# Patient Record
Sex: FEMALE | Race: WHITE | NOT HISPANIC OR LATINO | URBAN - METROPOLITAN AREA
[De-identification: names, ages, dates, MRNs, and addresses within clinical notes are randomized per-mention and may not be internally consistent; named-entity substitution may affect disease eponyms.]

---

## 2023-01-05 ENCOUNTER — OFFICE VISIT (OUTPATIENT)
Dept: FAMILY MEDICINE | Facility: CLINIC | Age: 43
End: 2023-01-05
Payer: COMMERCIAL

## 2023-01-05 VITALS
DIASTOLIC BLOOD PRESSURE: 65 MMHG | HEIGHT: 65 IN | TEMPERATURE: 98.7 F | BODY MASS INDEX: 25.01 KG/M2 | HEART RATE: 60 BPM | SYSTOLIC BLOOD PRESSURE: 114 MMHG | RESPIRATION RATE: 16 BRPM | WEIGHT: 150.08 LBS | OXYGEN SATURATION: 98 %

## 2023-01-05 DIAGNOSIS — F41.8 DEPRESSION WITH ANXIETY: Primary | ICD-10-CM

## 2023-01-05 DIAGNOSIS — N32.81 OVERACTIVE BLADDER: ICD-10-CM

## 2023-01-05 DIAGNOSIS — Z23 NEED FOR VACCINATION: ICD-10-CM

## 2023-01-05 PROBLEM — Z97.5 IUD (INTRAUTERINE DEVICE) IN PLACE: Status: ACTIVE | Noted: 2019-01-23

## 2023-01-05 PROBLEM — F43.22 ADJUSTMENT DISORDER WITH ANXIETY: Status: ACTIVE | Noted: 2022-02-28

## 2023-01-05 RX ORDER — ESCITALOPRAM OXALATE 10 MG/1
TABLET ORAL
Qty: 21 TABLET | Refills: 0 | Status: SHIPPED | OUTPATIENT
Start: 2023-01-05 | End: 2023-02-07

## 2023-01-05 RX ORDER — SERTRALINE HYDROCHLORIDE 100 MG/1
2 TABLET, FILM COATED ORAL DAILY
COMMUNITY
Start: 2022-10-19 | End: 2023-02-07

## 2023-01-05 RX ORDER — OXYBUTYNIN CHLORIDE 5 MG/1
5 TABLET ORAL
COMMUNITY
Start: 2021-12-28 | End: 2023-01-05

## 2023-01-05 RX ORDER — TRETINOIN 0.25 MG/G
CREAM TOPICAL
COMMUNITY
Start: 2022-02-09 | End: 2024-03-28

## 2023-01-05 RX ORDER — BENZOYL PEROXIDE 5 G/100G
GEL TOPICAL
COMMUNITY
Start: 2021-12-28 | End: 2024-03-28

## 2023-01-05 ASSESSMENT — ANXIETY QUESTIONNAIRES
7. FEELING AFRAID AS IF SOMETHING AWFUL MIGHT HAPPEN: NOT AT ALL
IF YOU CHECKED OFF ANY PROBLEMS ON THIS QUESTIONNAIRE, HOW DIFFICULT HAVE THESE PROBLEMS MADE IT FOR YOU TO DO YOUR WORK, TAKE CARE OF THINGS AT HOME, OR GET ALONG WITH OTHER PEOPLE: SOMEWHAT DIFFICULT
3. WORRYING TOO MUCH ABOUT DIFFERENT THINGS: NOT AT ALL
GAD7 TOTAL SCORE: 7
1. FEELING NERVOUS, ANXIOUS, OR ON EDGE: SEVERAL DAYS
6. BECOMING EASILY ANNOYED OR IRRITABLE: NEARLY EVERY DAY
2. NOT BEING ABLE TO STOP OR CONTROL WORRYING: NOT AT ALL
GAD7 TOTAL SCORE: 7
5. BEING SO RESTLESS THAT IT IS HARD TO SIT STILL: NOT AT ALL

## 2023-01-05 ASSESSMENT — PATIENT HEALTH QUESTIONNAIRE - PHQ9
SUM OF ALL RESPONSES TO PHQ QUESTIONS 1-9: 6
5. POOR APPETITE OR OVEREATING: NEARLY EVERY DAY

## 2023-01-05 NOTE — PATIENT INSTRUCTIONS
DECREASE sertraline to 50 mg per day (1/2 tablet)  START escitalopram (Lexapro) 5 mg per day  Continue this for 7-14 days    Then  STOP sertraline  INCREASE Lexapro to 10 mg per day    Urology referral today for overactive bladder    Follow-up with me in 4 weeks for medication check.

## 2023-01-05 NOTE — NURSING NOTE
Prior to immunization administration, verified patients identity using patient s name and date of birth. Please see Immunization Activity for additional information.     Screening Questionnaire for Adult Immunization    Are you sick today?   No   Do you have allergies to medications, food, a vaccine component or latex?   No   Have you ever had a serious reaction after receiving a vaccination?   No   Do you have a long-term health problem with heart, lung, kidney, or metabolic disease (e.g., diabetes), asthma, a blood disorder, no spleen, complement component deficiency, a cochlear implant, or a spinal fluid leak?  Are you on long-term aspirin therapy?   No   Do you have cancer, leukemia, HIV/AIDS, or any other immune system problem?   No   Do you have a parent, brother, or sister with an immune system problem?   No   In the past 3 months, have you taken medications that affect  your immune system, such as prednisone, other steroids, or anticancer drugs; drugs for the treatment of rheumatoid arthritis, Crohn s disease, or psoriasis; or have you had radiation treatments?   No   Have you had a seizure, or a brain or other nervous system problem?   No   During the past year, have you received a transfusion of blood or blood    products, or been given immune (gamma) globulin or antiviral drug?   No   For women: Are you pregnant or is there a chance you could become       pregnant during the next month?   No   Have you received any vaccinations in the past 4 weeks?   No     Immunization questionnaire answers were all negative.        Per orders of Dr. Eli, injection of Tdap given by Lizy Clemens MA. Patient instructed to remain in clinic for 15 minutes afterwards, and to report any adverse reaction to me immediately.       Screening performed by Lizy Clemens MA on 1/5/2023 at 1:44 PM.

## 2023-01-05 NOTE — PROGRESS NOTES
CC: Recheck Medication (Would like to review medications.)        ASSESSMENT/PLAN: 43 y/o F with a history of depression, anxiety, overactive bladder who presents for follow-up of several concerns.    Lupe was seen today for recheck medication.    Diagnoses and all orders for this visit:    Depression with anxiety  -     escitalopram (LEXAPRO) 10 MG tablet; Take 0.5 tablets (5 mg) by mouth daily for 14 days, THEN 1 tablet (10 mg) daily for 14 days.    Overactive bladder  -     Adult Urology  Referral; Future    Need for vaccination  -     TDAP VACCINE (Adacel, Boostrix)  [[id number]]      Would like to change selective serotonin reuptake inhibitor due to concerns for persistent anhedonia, low energy, and general flattening of affect. Has previously tried Wellbutrin, Prozac, lamictal, Paxil. After a discussion of risks and benefits, patient agreeable to a trial of escitalopram for depression/anxiety. Common side effects were discussed and the patient was notified it takes 4-6 weeks to see full impact of this medication. Self cares and the importance of therapy/counseling were also discussed. Patient will follow-up in 4 weeks for a medication check, sooner with any questions, concerns or problems with the medication.     Patient with history of OAB, unable to tolerate oxybutynin due to side effects. Caffeine reduction helps significantly but does not resolve symptoms. Recommend consultation with urology to review treatment options.    Per patient, concern for auditory processing disorder/sensory sensitivity. Will look into treatment options in  Health system.    Worrisome signs and symptoms were discussed with Lupe and she was instructed to return to the clinic for concerning symptoms or to call with questions. All patient questions answered.    Follow up: 4 weeks      Babita Eli MD/MPH  Family Medicine      SUBJECTIVE: Lupe is a 42 year old female who comes in with the following  "concerns:    Mood f/u  Seeing a therapist  Has decreased dose of sertraline from 200 mg to 100 mg a couple  No real change in mood with this dose change  Not sure if mood lability has returned or not  Had previously tried lamictal - improved depression but had a lot of confusion/brain fog as side  Wellbutrin - doesn't remember what her experience was  Paxil, Prozac previously tried - doesn't recall experience    History of difficultly with hearing with background noise  Easily overstimulated by noise with kids  Hearing testing in the past was always normal but she did not discern voices well when background noise was played.    Overactive bladder continues to be an issue  Caffeine reduction helps  However, still causes a lot of anxiety when out and about without easy bathroom access  Will void large amounts of urine with urgency  Oxybutynin did not help much, led to dry mouth and sore throat.    PHQ-9 score:    PHQ 1/5/2023   PHQ-9 Total Score 6   Q9: Thoughts of better off dead/self-harm past 2 weeks Not at all         CHANDNI-7 SCORE 1/5/2023   Total Score 7               OBJECTIVE:   /65 (BP Location: Right arm, Patient Position: Sitting, Cuff Size: Adult Regular)   Pulse 60   Temp 98.7  F (37.1  C) (Skin)   Resp 16   Ht 1.638 m (5' 4.5\")   Wt 68.1 kg (150 lb 1.3 oz)   LMP 12/29/2022 (Approximate)   SpO2 98%   BMI 25.36 kg/m    General: Well-appearing in NAD   HEENT: Normocephalic, atraumatic. Mucus membranes moist.   Resp: No respiratory distress   MSK: No peripheral edema.   Skin: No rashes or lesions noted.   Psych: Appropriate affect.      "

## 2023-01-05 NOTE — NURSING NOTE
"42 year old  Chief Complaint   Patient presents with     Recheck Medication     Would like to review medications.       Blood pressure 114/65, pulse 60, temperature 98.7  F (37.1  C), temperature source Skin, resp. rate 16, height 1.638 m (5' 4.5\"), weight 68.1 kg (150 lb 1.3 oz), last menstrual period 12/29/2022, SpO2 98 %. Body mass index is 25.36 kg/m .  Patient Active Problem List   Diagnosis     Subcutaneous nodules     Sacroiliitis (H)     IUD (intrauterine device) in place     History of depression     Esophageal reflux     Disorder of bladder     Postpartum depression     Back strain     Anxiety state     Allergy     Adjustment disorder with anxiety     Acne     Poor fetal growth, affecting management of mother, antepartum condition or complication       Wt Readings from Last 2 Encounters:   01/05/23 68.1 kg (150 lb 1.3 oz)     BP Readings from Last 3 Encounters:   01/05/23 114/65         Current Outpatient Medications   Medication     benzoyl peroxide 5 % topical gel     levonorgestrel (MIRENA) 20 MCG/DAY IUD     sertraline (ZOLOFT) 100 MG tablet     tretinoin (RETIN-A) 0.025 % external cream     oxybutynin (DITROPAN) 5 MG tablet     No current facility-administered medications for this visit.       Social History     Tobacco Use     Smoking status: Never     Smokeless tobacco: Never   Vaping Use     Vaping Use: Never used   Substance Use Topics     Alcohol use: Yes     Comment: 1-2 times weekly     Drug use: Never       Health Maintenance Due   Topic Date Due     ADVANCE CARE PLANNING  Never done     HIV SCREENING  Never done     HEPATITIS C SCREENING  Never done     PAP  Never done     YEARLY PREVENTIVE VISIT  07/29/2009     PHQ-2 (once per calendar year)  Never done       No results found for: PAP      January 5, 2023 12:56 PM    "

## 2023-02-07 ENCOUNTER — OFFICE VISIT (OUTPATIENT)
Dept: FAMILY MEDICINE | Facility: CLINIC | Age: 43
End: 2023-02-07
Payer: COMMERCIAL

## 2023-02-07 VITALS
SYSTOLIC BLOOD PRESSURE: 109 MMHG | HEART RATE: 69 BPM | DIASTOLIC BLOOD PRESSURE: 72 MMHG | TEMPERATURE: 97.3 F | BODY MASS INDEX: 25.35 KG/M2 | OXYGEN SATURATION: 99 % | WEIGHT: 150 LBS | RESPIRATION RATE: 16 BRPM

## 2023-02-07 DIAGNOSIS — F41.8 DEPRESSION WITH ANXIETY: Primary | ICD-10-CM

## 2023-02-07 DIAGNOSIS — U07.1 INFECTION DUE TO 2019 NOVEL CORONAVIRUS: ICD-10-CM

## 2023-02-07 DIAGNOSIS — H93.25 AUDITORY PROCESSING DISORDER: ICD-10-CM

## 2023-02-07 RX ORDER — ESCITALOPRAM OXALATE 10 MG/1
10 TABLET ORAL DAILY
Qty: 90 TABLET | Refills: 0 | Status: SHIPPED | OUTPATIENT
Start: 2023-02-07 | End: 2023-03-07

## 2023-02-07 ASSESSMENT — ANXIETY QUESTIONNAIRES
7. FEELING AFRAID AS IF SOMETHING AWFUL MIGHT HAPPEN: NOT AT ALL
GAD7 TOTAL SCORE: 7
5. BEING SO RESTLESS THAT IT IS HARD TO SIT STILL: NOT AT ALL
1. FEELING NERVOUS, ANXIOUS, OR ON EDGE: MORE THAN HALF THE DAYS
IF YOU CHECKED OFF ANY PROBLEMS ON THIS QUESTIONNAIRE, HOW DIFFICULT HAVE THESE PROBLEMS MADE IT FOR YOU TO DO YOUR WORK, TAKE CARE OF THINGS AT HOME, OR GET ALONG WITH OTHER PEOPLE: SOMEWHAT DIFFICULT
GAD7 TOTAL SCORE: 7
3. WORRYING TOO MUCH ABOUT DIFFERENT THINGS: SEVERAL DAYS
6. BECOMING EASILY ANNOYED OR IRRITABLE: NEARLY EVERY DAY
2. NOT BEING ABLE TO STOP OR CONTROL WORRYING: NOT AT ALL

## 2023-02-07 ASSESSMENT — PATIENT HEALTH QUESTIONNAIRE - PHQ9
5. POOR APPETITE OR OVEREATING: SEVERAL DAYS
SUM OF ALL RESPONSES TO PHQ QUESTIONS 1-9: 11

## 2023-02-07 NOTE — PATIENT INSTRUCTIONS
Continue Lexapro 10 mg per day  If persistent symptoms in 2-3 weeks, send me a message and we will increase the dose to 15 mg    Follow-up with me in 4-6 weeks    Schedule occupational therapy appointment for auditory processing therapy/concerns.

## 2023-02-07 NOTE — NURSING NOTE
42 year old  Chief Complaint   Patient presents with     Recheck Medication     Lexapro       Blood pressure 109/72, pulse 69, temperature 97.3  F (36.3  C), temperature source Temporal, resp. rate 16, weight 68 kg (150 lb), last menstrual period 12/29/2022, SpO2 99 %. Body mass index is 25.35 kg/m .  Patient Active Problem List   Diagnosis     Subcutaneous nodules     Sacroiliitis (H)     IUD (intrauterine device) in place     History of depression     Esophageal reflux     Disorder of bladder     Postpartum depression     Back strain     Anxiety state     Allergy     Adjustment disorder with anxiety     Acne     Poor fetal growth, affecting management of mother, antepartum condition or complication       Wt Readings from Last 2 Encounters:   02/07/23 68 kg (150 lb)   01/05/23 68.1 kg (150 lb 1.3 oz)     BP Readings from Last 3 Encounters:   02/07/23 109/72   01/05/23 114/65         Current Outpatient Medications   Medication     benzoyl peroxide 5 % topical gel     levonorgestrel (MIRENA) 20 MCG/DAY IUD     sertraline (ZOLOFT) 100 MG tablet     tretinoin (RETIN-A) 0.025 % external cream     escitalopram (LEXAPRO) 10 MG tablet     No current facility-administered medications for this visit.       Social History     Tobacco Use     Smoking status: Never     Smokeless tobacco: Never   Vaping Use     Vaping Use: Never used   Substance Use Topics     Alcohol use: Yes     Comment: 1-2 times weekly     Drug use: Never       Health Maintenance Due   Topic Date Due     ADVANCE CARE PLANNING  Never done     HIV SCREENING  Never done     HEPATITIS C SCREENING  Never done     YEARLY PREVENTIVE VISIT  07/29/2009       No results found for: PAP      February 7, 2023 12:55 PM

## 2023-02-07 NOTE — PROGRESS NOTES
CC: Recheck Medication (Lexapro)        ASSESSMENT/PLAN: 43 y/o F with depression, anxiety, auditory processing disorder (informal diagnosis) presenting for medication follow-up. High stress month at home with  traveling + family getting COVID so hard to tell if Lexapro is more effective than sertraline. Will continue for another 2-3 weeks then consider a dose increase, tolerating well. Follow-up in 4-6 weeks.    Lupe was seen today for recheck medication.    Diagnoses and all orders for this visit:    Depression with anxiety  -     escitalopram (LEXAPRO) 10 MG tablet; Take 1 tablet (10 mg) by mouth daily      Infection due to 2019 novel coronavirus  Symptoms improving/resolving    Auditory processing disorder  -     Occupational Therapy Referral; Future  Patient with concerns of auditory overwhelm with multiple symptoms or loud noises. Would like to have therapy to address these ongoing concerns, presumptive diagnosis made when she was younger. Consider formal neuropsych testing in the future      Worrisome signs and symptoms were discussed with Lupe and she was instructed to return to the clinic for concerning symptoms or to call with questions. All patient questions answered.    Follow up: 4 weeks    Babita Eli MD/MPH  Family Medicine      SUBJECTIVE: Lupe is a 42 year old female who comes in with the following concerns:    Med check  Not sure how well the med change is working.    No side effects, issues with making the change. Tolerating it better than previous antidepressant  Taking 10 mg escitalopram x 2 weeks  This month has been stressful -  has been traveling, she and her children got COVID    COVID - moderate cold-like symptoms  Able to tolerate a walk over lunch  Respiratory status improving, still fatigued but improving daily    Would be interested in occupational therapy to address auditory processing symptoms as she feels this contributes to stress/anxiety    PHQ 1/5/2023  2/7/2023   PHQ-9 Total Score 6 11   Q9: Thoughts of better off dead/self-harm past 2 weeks Not at all Not at all     CHANDNI-7 SCORE 1/5/2023 2/7/2023   Total Score 7 7             OBJECTIVE:   /72 (BP Location: Left arm, Patient Position: Sitting, Cuff Size: Adult Regular)   Pulse 69   Temp 97.3  F (36.3  C) (Temporal)   Resp 16   Wt 68 kg (150 lb)   LMP 12/29/2022 (Approximate)   SpO2 99%   BMI 25.35 kg/m    General: Well-appearing in NAD   HEENT: Normocephalic, atraumatic. Mucus membranes moist.   Resp: No respiratory distress   MSK: No peripheral edema.   Skin: No rashes or lesions noted.   Psych: Appropriate affect. Mood is good

## 2023-02-17 ENCOUNTER — OFFICE VISIT (OUTPATIENT)
Dept: UROLOGY | Facility: CLINIC | Age: 43
End: 2023-02-17
Payer: COMMERCIAL

## 2023-02-17 VITALS
HEIGHT: 64 IN | HEART RATE: 80 BPM | WEIGHT: 140 LBS | SYSTOLIC BLOOD PRESSURE: 110 MMHG | OXYGEN SATURATION: 99 % | BODY MASS INDEX: 23.9 KG/M2 | DIASTOLIC BLOOD PRESSURE: 62 MMHG

## 2023-02-17 DIAGNOSIS — R39.15 URINARY URGENCY: Primary | ICD-10-CM

## 2023-02-17 DIAGNOSIS — R35.0 URINARY FREQUENCY: ICD-10-CM

## 2023-02-17 DIAGNOSIS — N39.3 FEMALE STRESS INCONTINENCE: ICD-10-CM

## 2023-02-17 LAB
ALBUMIN UR-MCNC: NEGATIVE MG/DL
APPEARANCE UR: CLEAR
BILIRUB UR QL STRIP: NEGATIVE
COLOR UR AUTO: YELLOW
GLUCOSE UR STRIP-MCNC: NEGATIVE MG/DL
HGB UR QL STRIP: ABNORMAL
KETONES UR STRIP-MCNC: ABNORMAL MG/DL
LEUKOCYTE ESTERASE UR QL STRIP: NEGATIVE
MUCOUS THREADS #/AREA URNS LPF: PRESENT /LPF
NITRATE UR QL: NEGATIVE
PH UR STRIP: 6 [PH] (ref 5–7)
RBC URINE: 0 /HPF
RESIDUAL VOLUME (RV) (EXTERNAL): 43
SP GR UR STRIP: 1.02 (ref 1–1.03)
SQUAMOUS EPITHELIAL: 1 /HPF
UROBILINOGEN UR STRIP-ACNC: 0.2 E.U./DL
WBC URINE: 0 /HPF

## 2023-02-17 PROCEDURE — 99204 OFFICE O/P NEW MOD 45 MIN: CPT | Mod: 25 | Performed by: PHYSICIAN ASSISTANT

## 2023-02-17 PROCEDURE — 81001 URINALYSIS AUTO W/SCOPE: CPT | Performed by: PHYSICIAN ASSISTANT

## 2023-02-17 PROCEDURE — 51798 US URINE CAPACITY MEASURE: CPT | Performed by: PHYSICIAN ASSISTANT

## 2023-02-17 RX ORDER — MIRABEGRON 25 MG/1
25 TABLET, FILM COATED, EXTENDED RELEASE ORAL DAILY
Qty: 30 TABLET | Refills: 3 | Status: SHIPPED | OUTPATIENT
Start: 2023-02-17 | End: 2023-02-20

## 2023-02-17 ASSESSMENT — ANXIETY QUESTIONNAIRES
6. BECOMING EASILY ANNOYED OR IRRITABLE: NEARLY EVERY DAY
5. BEING SO RESTLESS THAT IT IS HARD TO SIT STILL: NOT AT ALL
1. FEELING NERVOUS, ANXIOUS, OR ON EDGE: SEVERAL DAYS
7. FEELING AFRAID AS IF SOMETHING AWFUL MIGHT HAPPEN: NOT AT ALL
2. NOT BEING ABLE TO STOP OR CONTROL WORRYING: NOT AT ALL
GAD7 TOTAL SCORE: 8
GAD7 TOTAL SCORE: 8
3. WORRYING TOO MUCH ABOUT DIFFERENT THINGS: SEVERAL DAYS

## 2023-02-17 ASSESSMENT — PATIENT HEALTH QUESTIONNAIRE - PHQ9
5. POOR APPETITE OR OVEREATING: NEARLY EVERY DAY
SUM OF ALL RESPONSES TO PHQ QUESTIONS 1-9: 8

## 2023-02-17 ASSESSMENT — PAIN SCALES - GENERAL: PAINLEVEL: NO PAIN (0)

## 2023-02-17 NOTE — NURSING NOTE
Chief Complaint   Patient presents with     Overactive Bladder      Patient here today to discuss Overactive Bladder     Patient state she has tried medication and didn't help.  Patient had an PVR today      PVR 43ml

## 2023-02-17 NOTE — PATIENT INSTRUCTIONS
Myrbetriq can help with urinary urgency/frequency and urge incontinence. Some potential risks of Myrbetriq include but not limited to headache, nasal congestion and increase in blood pressure. Advise you to monitor your blood pressure at home while taking Myrbetriq, and to contact our clinic if develop elevated blood pressures. Studies show that changes in blood pressure are typically not clinically significant (per UpToDate). Recommend notifying your primary care provider and/or your cardiologist if have history of HTN or other heart issues before taking Myrbetriq, and to contact our clinic if your provider does not recommend you start this medication. The medication can take up to 6 weeks to take full effect. Contact our clinic if you develop side effects to this medication.      Below is a list of things that can irritate the bladder and should be eliminated:  Caffeinated soft drinks.  Coffee.  Tea.  Chocolate.  Tomato-based foods.  Acidic juices and fruits. (includes cranberry juice)  Alcohol.  Nicotine  Carbonated drinks.  Aspartame/Nutrasweet.    - Follow-up in 3 months with me  - Remember stress can affect your bladder symptoms.   - Try to void every 2-3 hours during the day to help avoid urinary incontinence and strong urgency.

## 2023-02-17 NOTE — PROGRESS NOTES
Urology Clinic    Name: Lupe Sifuentes    MRN: [id number]   YOB: 1980  Accompanied at today's visit by:self              Assessment and Plan:   42 year old female with urinary urgency/frequency, KIANA.     - PVR WNL  - UA shows hematuria and ketones; will get micro. Consider hematuria work-up if positive for RBC.  - Start Myrbetriq 25mg daily. Discussed risks/benifits and potential side effects. If too expensive, switch to vesicare.   - Avoid bladder irritants.  - Not interested in PFPT.   - Discussed third line options as consider if fails conservative therapies.  - Consider UDS and cysto if fails conservative therapies.   - follow-up in 3 months with PVR and BP check.     Orders Placed This Encounter   Procedures     MEASURE POST-VOID RESIDUAL URINE/BLADDER CAPACITY, US NON-IMAGING (07385)     UA without Microscopic [[id number]]     UM[facility] - Urine Micro Only     After discussing the assessment and plan with patient, patient verbalized understanding and agreed to the above plan. All questions answered.     42 minutes were spent today on the date of the encounter in reviewing the EMR, direct patient care, coordination of care and documentation in addition to exam, ordering medication.    Monika Rolle PA-C  February 17, 2023    Patient Care Team:  Babita Eli MD as PCP - General (Family Medicine)  Monika Rolle PA-C as Physician Assistant  BABITA ELI          Chief Complaint:   OAB          History of Present Illness:   February 17, 2023    HISTORY: Lupe Sifuentes is a 42 year old female as a new consultation for concerns of OAB; referred by Dr. Eli. Saw urology at [facility] in 2009 for urgency, frequency, and UUI. was perscribed oxybutynin patch and PFPT. Had dry mouth with oxybutynin per chart review. Patient reports voiding 2 hours at least but can go longer.x/day and 0x/night. Reports her urinary urgency/freuqency fluctuates day to day. Notices anxiety and coffee affect  her bladder symptoms. Describes rare KIANA. Drinks coffee once per day. Denies gross hematuria or history of kidney stones. Denies history of recurrent UTIs.  Is sexually active. Denies pain with intercourse or leakage with intercourse.  Bowels are regular.   Denies menses today or vaginal spotting. Denies hx of exposure to chemicals, hx cancer, family hx of cancer or stones, excessive NSAID use, hx of smoking. PSH includes IUD placement in 2021. Has had 2 c-sections. Denies any other  surgeries. Patient voices no other concerns at this time.            Past Medical History:   History reviewed. No pertinent past medical history.         Past Surgical History:   History reviewed. No pertinent surgical history.         Social History:     Social History     Tobacco Use     Smoking status: Never     Smokeless tobacco: Never   Substance Use Topics     Alcohol use: Yes     Comment: 1-2 times weekly            Family History:   History reviewed. No pertinent family history.           Allergies:     Allergies   Allergen Reactions     Acetaminophen      vomiting      Oxycodone Nausea and Vomiting     vomiting   vomiting        Sulfa Drugs Hives and Rash     hives, PN: LW Reaction: HIVES       Oxycodone-Acetaminophen GI Disturbance and Nausea and Vomiting     Other reaction(s): Nausea and/or Vomiting              Medications:     Current Outpatient Medications   Medication Sig     benzoyl peroxide 5 % topical gel Apply topically to face once daily  Indications: Common Acne     escitalopram (LEXAPRO) 10 MG tablet Take 1 tablet (10 mg) by mouth daily     levonorgestrel (MIRENA) 20 MCG/DAY IUD 1 each by Intrauterine route     mirabegron (MYRBETRIQ) 25 MG 24 hr tablet Take 1 tablet (25 mg) by mouth daily     tretinoin (RETIN-A) 0.025 % external cream Apply a thin layer to face 2 nights each week and slowly increase to nightly use as tolerated     No current facility-administered medications for this visit.             Review of  "Systems:    ROS: 14 point ROS neg other than the symptoms noted above in the HPI.          Physical Exam:   Blood pressure 110/62, pulse 80, height 1.626 m (5' 4\"), weight 63.5 kg (140 lb), last menstrual period 12/29/2022, SpO2 99 %.  5' 4\"[PER PT[, Body mass index is 24.03 kg/m ., 140 lbs 0 oz  Gen appearance: Age-appropriate appearing female in NAD.   HEENT:  EOMI, conjunctiva clear/white. Normal ROM of neck for age.   Psych:  alert , In no acute distress.  Neuro:  A&Ox3  Skin:  Clear of obvious rashes, ecchymoses.  Resp:  Normal respiratory effort; not in acute respiratory distress.   Vasc:  Regular rate.  lymph:  No obvious LE edema bilaterally.     exam:  Vulva is normal in appearance. Urethra normal.. Negative for KIANA with reduction of speculum when instructed to cough. No pain to palpation on internal or external exam. Cystocele grade 2. Rectocele grade 2. Strength 1/5. No obvious masses, lesions, ulcers, bleeding noted on internal or external exam.          Data:    PVR  43mL    Labs:  UA RESULTS:  Recent Labs   Lab Test 02/17/23  1427   COLOR Yellow   APPEARANCE Clear   URINEGLC Negative   URINEBILI Negative   URINEKETONE Trace*   SG 1.025   UBLD Moderate*   URINEPH 6.0   PROTEIN Negative   UROBILINOGEN 0.2   NITRITE Negative   LEUKEST Negative       Creatinine   Date Value Ref Range Status   04/06/2009 0.79 0.52 - 1.04 mg/dL Final     Comment:     New IDMS-traceable calibration  beginning 5/1/08       Historic Results on 04/06/2009   Component Date Value Ref Range Status     HCG Qual Urine 04/06/2009 Negative  NEG Final     Source 04/06/2009 Midstream Urine   Final     Color Urine 04/06/2009 Yellow   Final     Appearance Urine 04/06/2009 Slightly Cloudy   Final     Glucose Urine 04/06/2009 Negative  NEG mg/dL Final     Bilirubin Urine 04/06/2009 Negative  NEG Final     Ketones Urine 04/06/2009 Negative  NEG mg/dL Final     Specific Gravity Urine 04/06/2009 1.015  1.003 - 1.035 Final     Blood Urine " 04/06/2009 Negative  NEG Final     pH Urine 04/06/2009 8.5 (H)  5.0 - 7.0 pH Final     Protein Albumin Urine 04/06/2009 Negative  NEG mg/dL Final     Urobilinogen mg/dL 04/06/2009 Normal  0.0 - 2.0 mg/dL Final     Nitrite Urine 04/06/2009 Negative  NEG Final     Leukocyte Esterase Urine 04/06/2009 Negative  NEG Final     WBC Urine 04/06/2009 0  0 - 2 /HPF Final     RBC Urine 04/06/2009 0  0 - 2 /HPF Final     Squamous Epithelial /HPF Urine 04/06/2009 1  0 - 1 /HPF Final     Amorphous Crystals 04/06/2009 Few (A)  NEG /HPF Final     Lipase 04/06/2009 41  20 - 250 U/L Final     Sodium 04/06/2009 136  133 - 144 mmol/L Final     Potassium 04/06/2009 3.5  3.4 - 5.3 mmol/L Final     Chloride 04/06/2009 103  94 - 109 mmol/L Final     Carbon Dioxide 04/06/2009 26  20 - 32 mmol/L Final     Glucose 04/06/2009 121 (H)  60 - 99 mg/dL Final     Urea Nitrogen 04/06/2009 11  5 - 24 mg/dL Final     Creatinine 04/06/2009 0.79  0.52 - 1.04 mg/dL Final    New IDMS-traceable calibration  beginning 5/1/08     GFR Estimate 04/06/2009 87  >60 mL/min/1.7m2 Final     GFR Estimate If Black 04/06/2009 >90  >60 mL/min/1.7m2 Final     Calcium 04/06/2009 9.4  8.5 - 10.4 mg/dL Final     AST 04/06/2009 22  0 - 45 U/L Final     Protein Total 04/06/2009 7.3  6.8 - 8.8 g/dL Final    As of 5/16/08, reference range reflects plasma specimen type.     Anion Gap 04/06/2009 7  6 - 17 mmol/L Final     Albumin 04/06/2009 4.2  3.9 - 5.1 g/dL Final     ALT 04/06/2009 14  0 - 50 U/L Final     Alkaline Phosphatase 04/06/2009 53  40 - 150 U/L Final     Bilirubin Total 04/06/2009 0.4  0.2 - 1.3 mg/dL Final     MCV 04/06/2009 84  78 - 100 fl Final     MCH 04/06/2009 28.9  26.5 - 33.0 pg Final     MCHC 04/06/2009 34.4  31.5 - 36.5 g/dL Final     RDW 04/06/2009 13.0  10.0 - 15.0 % Final     WBC 04/06/2009 6.8  4.0 - 11.0 10e9/L Final     RBC Count 04/06/2009 4.53  3.8 - 5.2 10e12/L Final     Hemoglobin 04/06/2009 13.1  11.7 - 15.7 g/dL Final     Hematocrit 04/06/2009  38.0  35.0 - 47.0 % Final     % Neutrophils 04/06/2009 73  40 - 75 % Final     % Lymphocytes 04/06/2009 20  20 - 48 % Final     % Monocytes 04/06/2009 6  0 - 12 % Final     % Eosinophils 04/06/2009 1  0 - 6 % Final     % Basophils 04/06/2009 0  0 - 2 % Final     Platelet Count 04/06/2009 184  150 - 450 10e9/L Final     Absolute Neutrophil 04/06/2009 5.0  1.6 - 8.3 10e9/L Final     Absolute Lymphocytes 04/06/2009 1.3  0.8 - 5.3 10e9/L Final     Absolute Monocytes 04/06/2009 0.4  0.0 - 1.3 10e9/L Final     Absolute Eosinophils 04/06/2009 0.0  0.0 - 0.7 10e9/L Final     Absolute Basophils 04/06/2009 0.0  0.0 - 0.2 10e9/L Final     Diff Method 04/06/2009 Automated Method   Final

## 2023-02-17 NOTE — LETTER
2/17/2023       RE: Lupe Sifuentes  [address]  [facility] [address]     Dear Colleague,    Thank you for referring your patient, Lupe Sifuentes, to the [facility] at [facility]. Please see a copy of my visit note below.    Urology Clinic    Name: Lupe Sifuentes    MRN: [id number]   YOB: 1980  Accompanied at today's visit by:self              Assessment and Plan:   42 year old female with urinary urgency/frequency, KIANA.     - PVR WNL  - UA shows hematuria and ketones; will get micro. Consider hematuria work-up if positive for RBC.  - Start Myrbetriq 25mg daily. Discussed risks/benifits and potential side effects. If too expensive, switch to vesicare.   - Avoid bladder irritants.  - Not interested in PFPT.   - Discussed third line options as consider if fails conservative therapies.  - Consider UDS and cysto if fails conservative therapies.   - follow-up in 3 months with PVR and BP check.     Orders Placed This Encounter   Procedures     MEASURE POST-VOID RESIDUAL URINE/BLADDER CAPACITY, US NON-IMAGING (06875)     UA without Microscopic [[id number]]     UMIC - Urine Micro Only     After discussing the assessment and plan with patient, patient verbalized understanding and agreed to the above plan. All questions answered.     42 minutes were spent today on the date of the encounter in reviewing the EMR, direct patient care, coordination of care and documentation in addition to exam, ordering medication.    Monika Rolle PA-C  February 17, 2023    Patient Care Team:  Babita Eli MD as PCP - General (Family Medicine)  Monika Rolle PA-C as Physician Assistant  BABITA ELI          Chief Complaint:   OAB          History of Present Illness:   February 17, 2023    HISTORY: Lupe Sifuentes is a 42 year old female as a new consultation for concerns of OAB; referred by Dr. Eli. Saw urology at  [facility] in 2009 for urgency, frequency, and UUI. was perscribed oxybutynin patch and PFPT. Had dry mouth with oxybutynin per chart review. Patient reports voiding 2 hours at least but can go longer.x/day and 0x/night. Reports her urinary urgency/freuqency fluctuates day to day. Notices anxiety and coffee affect her bladder symptoms. Describes rare KIANA. Drinks coffee once per day. Denies gross hematuria or history of kidney stones. Denies history of recurrent UTIs.  Is sexually active. Denies pain with intercourse or leakage with intercourse.  Bowels are regular.   Denies menses today or vaginal spotting. Denies hx of exposure to chemicals, hx cancer, family hx of cancer or stones, excessive NSAID use, hx of smoking. PSH includes IUD placement in 2021. Has had 2 c-sections. Denies any other  surgeries. Patient voices no other concerns at this time.            Past Medical History:   History reviewed. No pertinent past medical history.         Past Surgical History:   History reviewed. No pertinent surgical history.         Social History:     Social History     Tobacco Use     Smoking status: Never     Smokeless tobacco: Never   Substance Use Topics     Alcohol use: Yes     Comment: 1-2 times weekly            Family History:   History reviewed. No pertinent family history.           Allergies:     Allergies   Allergen Reactions     Acetaminophen      vomiting      Oxycodone Nausea and Vomiting     vomiting   vomiting        Sulfa Drugs Hives and Rash     hives, PN: LW Reaction: HIVES       Oxycodone-Acetaminophen GI Disturbance and Nausea and Vomiting     Other reaction(s): Nausea and/or Vomiting              Medications:     Current Outpatient Medications   Medication Sig     benzoyl peroxide 5 % topical gel Apply topically to face once daily  Indications: Common Acne     escitalopram (LEXAPRO) 10 MG tablet Take 1 tablet (10 mg) by mouth daily     levonorgestrel (MIRENA) 20 MCG/DAY IUD 1 each by  "Intrauterine route     mirabegron (MYRBETRIQ) 25 MG 24 hr tablet Take 1 tablet (25 mg) by mouth daily     tretinoin (RETIN-A) 0.025 % external cream Apply a thin layer to face 2 nights each week and slowly increase to nightly use as tolerated     No current facility-administered medications for this visit.             Review of Systems:    ROS: 14 point ROS neg other than the symptoms noted above in the HPI.          Physical Exam:   Blood pressure 110/62, pulse 80, height 1.626 m (5' 4\"), weight 63.5 kg (140 lb), last menstrual period 12/29/2022, SpO2 99 %.  5' 4\"[PER PT[, Body mass index is 24.03 kg/m ., 140 lbs 0 oz  Gen appearance: Age-appropriate appearing female in NAD.   HEENT:  EOMI, conjunctiva clear/white. Normal ROM of neck for age.   Psych:  alert , In no acute distress.  Neuro:  A&Ox3  Skin:  Clear of obvious rashes, ecchymoses.  Resp:  Normal respiratory effort; not in acute respiratory distress.   Vasc:  Regular rate.  lymph:  No obvious LE edema bilaterally.     exam:  Vulva is normal in appearance. Urethra normal.. Negative for KIANA with reduction of speculum when instructed to cough. No pain to palpation on internal or external exam. Cystocele grade 2. Rectocele grade 2. Strength 1/5. No obvious masses, lesions, ulcers, bleeding noted on internal or external exam.          Data:    PVR  43mL    Labs:  UA RESULTS:  Recent Labs   Lab Test 02/17/23  1427   COLOR Yellow   APPEARANCE Clear   URINEGLC Negative   URINEBILI Negative   URINEKETONE Trace*   SG 1.025   UBLD Moderate*   URINEPH 6.0   PROTEIN Negative   UROBILINOGEN 0.2   NITRITE Negative   LEUKEST Negative       Creatinine   Date Value Ref Range Status   04/06/2009 0.79 0.52 - 1.04 mg/dL Final     Comment:     New IDMS-traceable calibration  beginning 5/1/08       Historic Results on 04/06/2009   Component Date Value Ref Range Status     HCG Qual Urine 04/06/2009 Negative  NEG Final     Source 04/06/2009 Midstream Urine   Final     Color " Urine 04/06/2009 Yellow   Final     Appearance Urine 04/06/2009 Slightly Cloudy   Final     Glucose Urine 04/06/2009 Negative  NEG mg/dL Final     Bilirubin Urine 04/06/2009 Negative  NEG Final     Ketones Urine 04/06/2009 Negative  NEG mg/dL Final     Specific Gravity Urine 04/06/2009 1.015  1.003 - 1.035 Final     Blood Urine 04/06/2009 Negative  NEG Final     pH Urine 04/06/2009 8.5 (H)  5.0 - 7.0 pH Final     Protein Albumin Urine 04/06/2009 Negative  NEG mg/dL Final     Urobilinogen mg/dL 04/06/2009 Normal  0.0 - 2.0 mg/dL Final     Nitrite Urine 04/06/2009 Negative  NEG Final     Leukocyte Esterase Urine 04/06/2009 Negative  NEG Final     WBC Urine 04/06/2009 0  0 - 2 /HPF Final     RBC Urine 04/06/2009 0  0 - 2 /HPF Final     Squamous Epithelial /HPF Urine 04/06/2009 1  0 - 1 /HPF Final     Amorphous Crystals 04/06/2009 Few (A)  NEG /HPF Final     Lipase 04/06/2009 41  20 - 250 U/L Final     Sodium 04/06/2009 136  133 - 144 mmol/L Final     Potassium 04/06/2009 3.5  3.4 - 5.3 mmol/L Final     Chloride 04/06/2009 103  94 - 109 mmol/L Final     Carbon Dioxide 04/06/2009 26  20 - 32 mmol/L Final     Glucose 04/06/2009 121 (H)  60 - 99 mg/dL Final     Urea Nitrogen 04/06/2009 11  5 - 24 mg/dL Final     Creatinine 04/06/2009 0.79  0.52 - 1.04 mg/dL Final    New IDMS-traceable calibration  beginning 5/1/08     GFR Estimate 04/06/2009 87  >60 mL/min/1.7m2 Final     GFR Estimate If Black 04/06/2009 >90  >60 mL/min/1.7m2 Final     Calcium 04/06/2009 9.4  8.5 - 10.4 mg/dL Final     AST 04/06/2009 22  0 - 45 U/L Final     Protein Total 04/06/2009 7.3  6.8 - 8.8 g/dL Final    As of 5/16/08, reference range reflects plasma specimen type.     Anion Gap 04/06/2009 7  6 - 17 mmol/L Final     Albumin 04/06/2009 4.2  3.9 - 5.1 g/dL Final     ALT 04/06/2009 14  0 - 50 U/L Final     Alkaline Phosphatase 04/06/2009 53  40 - 150 U/L Final     Bilirubin Total 04/06/2009 0.4  0.2 - 1.3 mg/dL Final     MCV 04/06/2009 84  78 - 100 fl  Final     MCH 04/06/2009 28.9  26.5 - 33.0 pg Final     MCHC 04/06/2009 34.4  31.5 - 36.5 g/dL Final     RDW 04/06/2009 13.0  10.0 - 15.0 % Final     WBC 04/06/2009 6.8  4.0 - 11.0 10e9/L Final     RBC Count 04/06/2009 4.53  3.8 - 5.2 10e12/L Final     Hemoglobin 04/06/2009 13.1  11.7 - 15.7 g/dL Final     Hematocrit 04/06/2009 38.0  35.0 - 47.0 % Final     % Neutrophils 04/06/2009 73  40 - 75 % Final     % Lymphocytes 04/06/2009 20  20 - 48 % Final     % Monocytes 04/06/2009 6  0 - 12 % Final     % Eosinophils 04/06/2009 1  0 - 6 % Final     % Basophils 04/06/2009 0  0 - 2 % Final     Platelet Count 04/06/2009 184  150 - 450 10e9/L Final     Absolute Neutrophil 04/06/2009 5.0  1.6 - 8.3 10e9/L Final     Absolute Lymphocytes 04/06/2009 1.3  0.8 - 5.3 10e9/L Final     Absolute Monocytes 04/06/2009 0.4  0.0 - 1.3 10e9/L Final     Absolute Eosinophils 04/06/2009 0.0  0.0 - 0.7 10e9/L Final     Absolute Basophils 04/06/2009 0.0  0.0 - 0.2 10e9/L Final     Diff Method 04/06/2009 Automated Method   Final

## 2023-02-18 ENCOUNTER — TELEPHONE (OUTPATIENT)
Dept: UROLOGY | Facility: CLINIC | Age: 43
End: 2023-02-18
Payer: COMMERCIAL

## 2023-02-18 DIAGNOSIS — R39.15 URINARY URGENCY: Primary | ICD-10-CM

## 2023-02-18 NOTE — TELEPHONE ENCOUNTER
Central Prior Authorization Team   Phone: [phone]      PRIOR AUTHORIZATION DENIED    Medication: mirabegron (MYRBETRIQ) 25 MG 24 hr tablet - EPA DENIED     Denial Date: 2/18/2023    Denial Rational: TRY AND FAILED UP TO TWO PREFERRED MEDICATIONS:              Appeal Information:

## 2023-02-20 RX ORDER — SOLIFENACIN SUCCINATE 10 MG/1
10 TABLET, FILM COATED ORAL DAILY
Qty: 30 TABLET | Refills: 3 | Status: SHIPPED | OUTPATIENT
Start: 2023-02-20 | End: 2023-03-22

## 2023-02-20 NOTE — TELEPHONE ENCOUNTER
Patient will  the new med and try it,if it is not working,she will call us and let us know,she will need to try one other as well.  Trang James LPN

## 2023-02-28 ENCOUNTER — HOSPITAL ENCOUNTER (OUTPATIENT)
Dept: OCCUPATIONAL THERAPY | Facility: CLINIC | Age: 43
Discharge: HOME | End: 2023-02-28
Payer: COMMERCIAL

## 2023-02-28 DIAGNOSIS — Z78.9 IMPAIRED INSTRUMENTAL ACTIVITIES OF DAILY LIVING (IADL): Primary | ICD-10-CM

## 2023-02-28 DIAGNOSIS — H93.25 AUDITORY PROCESSING DISORDER: ICD-10-CM

## 2023-02-28 PROCEDURE — 97535 SELF CARE MNGMENT TRAINING: CPT | Mod: GO | Performed by: OCCUPATIONAL THERAPIST

## 2023-02-28 PROCEDURE — 97165 OT EVAL LOW COMPLEX 30 MIN: CPT | Mod: GO | Performed by: OCCUPATIONAL THERAPIST

## 2023-02-28 NOTE — PROGRESS NOTES
02/28/23 1300   Quick Adds   Quick Adds Certification   Type of Visit Initial Outpatient Occupational Therapy Evaluation   General Information   Start Of Care Date 02/28/23   Referring Physician Babita Eli MD   Orders Evaluate and treat as indicated   Orders Date 02/17/23   Medical Diagnosis Auditory processing disorder   Onset of Illness/Injury or Date of Surgery 02/07/23   Precautions/Limitations No known precautions/limitations   Surgical/Medical History Reviewed Yes   Additional Occupational Profile Info/Pertinent History of Current Problem Pt is a 41 yo f with depression, anxiety, auditory processing disorder (informal diagnosis). At office visit 2/7/23 it was noted that it has been a high stress month for this pt with  traveling and family getting COVID. Pt has concerns of feeling overwhelmed by auditory stimuli or loud noises and would like to have therapy address these ongoing concerns. Pt also reports difficulty distinguishing between sounds. Pt has high fidelity ear plugs for noisier environments.  Pt reports she has had hearing tested and that when background noise was at the volume of a whisper she was able to distinguish 40% of what a male voice was saying and 60% of a female voice. Pt has never had any SLP or OT therapy previously.   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   Patient/family Goals Statement Pt would like to be able to distinguish sounds such as people talking vs background noise and decrease sensitivity related to sounds.   Pain   Patient currently in pain Yes   Pain comments Chronic back pain-previously gymnist   Fall Risk Screen   Fall screen completed by OT   Have you fallen 2 or more times in the past year? No   Have you fallen and had an injury in the past year? No   Is patient a fall risk? No   Abuse Screen (yes response referral indicated)   Feels Unsafe at Home or Work/School no   Feels Threatened by Someone no   Does Anyone Try to Keep You From Having Contact with Others or Doing Things Outside Your Home? no   Physical Signs of Abuse Present no   Cognitive Status Examination   Orientation Orientation to person, place and time   Visual Perception   Visual Perception Wears glasses  (for distance)   Sensation   Upper Extremity Sensory Examination No deficits were identified   Sensation Comments Reports shooting pains down UE after long periods of painting   Range of Motion (ROM)   ROM Comments Grossly WNLs   Strength   Strength Comments Grossly WNLs   Coordination   Coordination Comments Grossly WNLs-no changes identified   Functional Mobility   Ambulation I without AD   Bathing   Level of [place] - Bathing independent   Upper Body Dressing   Level of [place]: Dress Upper Body independent   Lower Body Dressing   Level of [place]: Dress Lower Body independent   Toileting   Level of [place]: Toilet independent   Grooming   Level of [place]: Grooming independent   Eating/Self-Feeding   Level of [place]: Eating independent   Activity Tolerance   Activity Tolerance Pt presenting with some fatigue per FACIT score. Pt reports this level of fatigue is not new/abnormal for her.   Planned Therapy Interventions   Planned Therapy Interventions IADL training;Self care/Home  management   Adult OT Eval Goals   OT Eval Goals (Adult) 1    OT Goal 1   Goal Identifier symptom management   Goal Description Pt to I'ly utilize 2-3 compensatory strategies to manage sound sensitivity and maximize participation in daily living tasks such as childcare, home maintenance tasks, meal prep etc.   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 Yes   Patient, Family & other staff in agreement with plan of care Yes   Clinical Impression Comments Pt will benefit from skilled occupational therapy services to address the above goals   Therapy Certification   Certification date from 02/28/23   Certification date to 05/28/23   Certification I certify the need for these services furnished under this plan of treatment and while under my care.  (Physician co-signature of this document indicates review and certification of the therapy plan)   Total Evaluation Time   OT Maylin Low Complexity Minutes (74897) 23

## 2023-02-28 NOTE — ADDENDUM NOTE
Encounter addended by: Janessa Tirado, OTR on: 2/28/2023 2:03 PM   Actions taken: Flowsheet accepted, Clinical Note Signed

## 2023-02-28 NOTE — ADDENDUM NOTE
Encounter addended by: Janessa Tirado, OTR on: 2/28/2023 2:28 PM   Actions taken: Delete clinical note, Clinical Note Signed, Flowsheet accepted

## 2023-02-28 NOTE — PROGRESS NOTES
"   02/28/23 1300   Quick Adds   Quick Adds Certification   Type of Visit Initial Outpatient Occupational Therapy Evaluation   General Information   Start Of Care Date 02/28/23   Referring Physician Babita Eli MD   Orders Evaluate and treat as indicated   Orders Date 02/17/23   Medical Diagnosis Auditory processing disorder   Onset of Illness/Injury or Date of Surgery 02/07/23   Precautions/Limitations No known precautions/limitations   Surgical/Medical History Reviewed Yes   Additional Occupational Profile Info/Pertinent History of Current Problem Pt is a 41 yo f with depression, anxiety, auditory processing disorder (informal diagnosis). At office visit 2/7/23 it was noted that it has been a \"high stress\" month for this pt with  traveling and family getting COVID. Pt has concerns of feeling overwhelmed by auditory stimuli or loud noises and would like to have therapy address these ongoing concerns. Pt also reports difficulty distinguishing between sounds. Pt has high fidelity ear plugs for noisier environments. Pt has never had any SLP or OT therapy previously.   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   Patient/family Goals Statement Pt would like to be able to distinguish sounds such as people talking vs background noise " and decrease sensitivity related to sounds.   Pain   Patient currently in pain Yes   Pain comments Chronic back pain-previously gymnist   Fall Risk Screen   Fall screen completed by OT   Have you fallen 2 or more times in the past year? No   Have you fallen and had an injury in the past year? No   Is patient a fall risk? No   Abuse Screen (yes response referral indicated)   Feels Unsafe at Home or Work/School no   Feels Threatened by Someone no   Does Anyone Try to Keep You From Having Contact with Others or Doing Things Outside Your Home? no   Physical Signs of Abuse Present no   Cognitive Status Examination   Orientation Orientation to person, place and time   Visual Perception   Visual Perception Wears glasses  (for distance)   Sensation   Upper Extremity Sensory Examination No deficits were identified   Sensation Comments Reports shooting pains down UE after long periods of painting   Range of Motion (ROM)   ROM Comments Grossly WNLs   Strength   Strength Comments Grossly WNLs   Coordination   Coordination Comments Grossly WNLs-no changes identified   Functional Mobility   Ambulation I without AD   Bathing   Level of [place] - Bathing independent   Upper Body Dressing   Level of [place]: Dress Upper Body independent   Lower Body Dressing   Level of [place]: Dress Lower Body independent   Toileting   Level of [place]: Toilet independent   Grooming   Level of [place]: Grooming independent   Eating/Self-Feeding   Level of [place]: Eating independent   Activity Tolerance   Activity Tolerance Pt presenting with some fatigue per FACIT score. Pt reports this level of fatigue is not new/abnormal for her.   Planned Therapy Interventions   Planned Therapy Interventions IADL training;Self care/Home management   Adult OT Eval Goals   OT Eval Goals (Adult) 1    OT Goal 1   Goal Identifier symptom management   Goal Description Pt to I'ly utilize 2-3 compensatory strategies to manage sound  sensitivity and maximize participation in daily living tasks such as childcare, home maintenance tasks, meal prep etc.   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 Yes   Patient, Family & other staff in agreement with plan of care Yes   Clinical Impression Comments Pt will benefit from skilled occupational therapy services to address the above goals   Therapy Certification   Certification date from 02/28/23   Certification date to 05/28/23   Certification I certify the need for these services furnished under this plan of treatment and while under my care.  (Physician co-signature of this document indicates review and certification of the therapy plan)   Total Evaluation Time   OT Eval, Low Complexity Minutes ([id number]) 23

## 2023-02-28 NOTE — PROGRESS NOTES
[facility]      OUTPATIENT OCCUPATIONAL THERAPY  EVALUATION  PLAN OF TREATMENT FOR OUTPATIENT REHABILITATION  (COMPLETE FOR INITIAL CLAIMS ONLY)  Patient's Last Name, First Name, M.I.  YOB: 1980  Lupe Sifuentes                        Provider's Name  [facility] Medical Record No.  [id number]                               Onset Date:     02/07/23   Start of Care Date:     02/28/23   Type:     ___PT   _X_OT   ___SLP Medical Diagnosis:     Auditory processing disorder                          OT Diagnosis:     impaired IADLs Visits from SOC:  1   _________________________________________________________________________________  Plan of Treatment/Functional Goals:  IADL training, Self care/Home management         Goals  Goal Identifier: symptom management  Goal Description: Pt to I'ly utilize 2-3 compensatory strategies to manage sound sensitivity and maximize participation in daily living tasks such as childcare, home maintenance tasks, meal prep etc.  Target Date: 03/24/23        Predicted Duration of Therapy Intervention (days/wks): 1 f/u visit  DEANNA Camara          I CERTIFY THE NEED FOR THESE SERVICES FURNISHED UNDER        THIS PLAN OF TREATMENT AND WHILE UNDER MY CARE     (Physician co-signature of this document indicates review and certification of the therapy plan).                 ,    Certification date from: 02/28/23, Certification date to: 05/28/23               Referring Physician: Babita Eli MD     Initial Assessment        See Epic Evaluation      Start Of Care Date: 02/28/23

## 2023-03-07 ENCOUNTER — OFFICE VISIT (OUTPATIENT)
Dept: FAMILY MEDICINE | Facility: CLINIC | Age: 43
End: 2023-03-07
Payer: COMMERCIAL

## 2023-03-07 VITALS
TEMPERATURE: 98.9 F | OXYGEN SATURATION: 98 % | HEART RATE: 64 BPM | SYSTOLIC BLOOD PRESSURE: 118 MMHG | DIASTOLIC BLOOD PRESSURE: 78 MMHG | RESPIRATION RATE: 17 BRPM

## 2023-03-07 DIAGNOSIS — N32.81 OVERACTIVE BLADDER: ICD-10-CM

## 2023-03-07 DIAGNOSIS — F41.8 DEPRESSION WITH ANXIETY: Primary | ICD-10-CM

## 2023-03-07 DIAGNOSIS — H93.25 AUDITORY PROCESSING DISORDER: ICD-10-CM

## 2023-03-07 RX ORDER — DESVENLAFAXINE 50 MG/1
50 TABLET, FILM COATED, EXTENDED RELEASE ORAL DAILY
Qty: 90 TABLET | Refills: 0 | Status: SHIPPED | OUTPATIENT
Start: 2023-03-07 | End: 2023-05-09

## 2023-03-07 ASSESSMENT — ANXIETY QUESTIONNAIRES
6. BECOMING EASILY ANNOYED OR IRRITABLE: NEARLY EVERY DAY
3. WORRYING TOO MUCH ABOUT DIFFERENT THINGS: SEVERAL DAYS
5. BEING SO RESTLESS THAT IT IS HARD TO SIT STILL: NOT AT ALL
GAD7 TOTAL SCORE: 9
2. NOT BEING ABLE TO STOP OR CONTROL WORRYING: NOT AT ALL
1. FEELING NERVOUS, ANXIOUS, OR ON EDGE: MORE THAN HALF THE DAYS
7. FEELING AFRAID AS IF SOMETHING AWFUL MIGHT HAPPEN: NOT AT ALL
GAD7 TOTAL SCORE: 9
IF YOU CHECKED OFF ANY PROBLEMS ON THIS QUESTIONNAIRE, HOW DIFFICULT HAVE THESE PROBLEMS MADE IT FOR YOU TO DO YOUR WORK, TAKE CARE OF THINGS AT HOME, OR GET ALONG WITH OTHER PEOPLE: SOMEWHAT DIFFICULT

## 2023-03-07 ASSESSMENT — PATIENT HEALTH QUESTIONNAIRE - PHQ9
SUM OF ALL RESPONSES TO PHQ QUESTIONS 1-9: 9
5. POOR APPETITE OR OVEREATING: NEARLY EVERY DAY

## 2023-03-07 NOTE — NURSING NOTE
42 year old  Chief Complaint   Patient presents with     Recheck Medication     Lexapro -- no concerns with side effects, doesn't think it's working       Blood pressure 118/78, pulse 64, temperature 98.9  F (37.2  C), temperature source Temporal, resp. rate 17, SpO2 98 %. There is no height or weight on file to calculate BMI.  Patient Active Problem List   Diagnosis     Subcutaneous nodules     Sacroiliitis (H)     IUD (intrauterine device) in place     History of depression     Esophageal reflux     Disorder of bladder     Postpartum depression     Back strain     Anxiety state     Allergy     Adjustment disorder with anxiety     Acne     Poor fetal growth, affecting management of mother, antepartum condition or complication       Wt Readings from Last 2 Encounters:   02/17/23 63.5 kg (140 lb)   02/07/23 68 kg (150 lb)     BP Readings from Last 3 Encounters:   03/07/23 118/78   02/17/23 110/62   02/07/23 109/72         Current Outpatient Medications   Medication     benzoyl peroxide 5 % topical gel     escitalopram (LEXAPRO) 10 MG tablet     levonorgestrel (MIRENA) 20 MCG/DAY IUD     solifenacin (VESICARE) 10 MG tablet     tretinoin (RETIN-A) 0.025 % external cream     No current facility-administered medications for this visit.       Social History     Tobacco Use     Smoking status: Never     Smokeless tobacco: Never   Vaping Use     Vaping Use: Never used   Substance Use Topics     Alcohol use: Yes     Comment: 1-2 times weekly     Drug use: Never       Health Maintenance Due   Topic Date Due     ADVANCE CARE PLANNING  Never done     HIV SCREENING  Never done     HEPATITIS C SCREENING  Never done     YEARLY PREVENTIVE VISIT  07/29/2009       No results found for: PAP      March 7, 2023 12:55 PM

## 2023-03-07 NOTE — PROGRESS NOTES
CC: Recheck Medication (Lexapro -- no concerns with side effects, doesn't think it's working)        ASSESSMENT/PLAN:   Lupe was seen today for recheck medication.    Diagnoses and all orders for this visit:    Depression with anxiety  -     desvenlafaxine (PRISTIQ) 50 MG 24 hr tablet; Take 1 tablet (50 mg) by mouth daily  -     Adult Mental Health  Referral; Future      Has previously tried Wellbutrin, Prozac, lamictal (effective but too many side effects), Paxil, Effexor, sertraline (effective for a while, then stopped working), escitalopram (no effectiveness but well tolerated). She does not specifically remember effectiveness/side effects for other medications. Given multiple recent selective serotonin reuptake inhibitor trials reasonable to trial SNRI, will plan for prior authorization for Pristiq. Will also refer to behavioral health for assistance with medication management. Patient is appropriately engaged in self cares and therapy. No concern for SI/HI.    Auditory processing disorder  Following with OT    Overactive bladder  Following with urology.           Worrisome signs and symptoms were discussed with Lupe and she was instructed to return to the clinic for concerning symptoms or to call with questions. All patient questions answered.    Follow up: 4-6 weeks with myself or behavioral health    Babita Eli MD/[facility]  Family Medicine      SUBJECTIVE: Lupe is a 42 year old female who comes in with the following concerns:    Urology  Saw urology, oxybutynin previously tried. Myrbetriq not covered by insurance. Vesicare has led to similar side effects. Will follow-up with urology on next steps/options    Occupational therapy  Felt this was affirming and helps with awareness of triggers    Lexapro  Things have not felt great on Lexapro  No side effect concerns, but doesn't feel effective at all    OBJECTIVE:   /78 (BP Location: Right arm, Patient Position: Sitting, Cuff Size: Adult  Regular)   Pulse 64   Temp 98.9  F (37.2  C) (Temporal)   Resp 17   SpO2 98%   General: Well-appearing in NAD   HEENT: Normocephalic, atraumatic. Mucus membranes moist.   Resp: No respiratory distress   MSK: No peripheral edema.   Skin: No rashes or lesions noted.   Psych: Appropriate affect. Mood is good

## 2023-03-07 NOTE — PATIENT INSTRUCTIONS
START taking desvenlafaxine (Pristiq) daily in the morning.    DECREASE escitalopram to 5 mg (1/2 tablet) daily for 1 week, then stop.

## 2023-03-08 ENCOUNTER — TELEPHONE (OUTPATIENT)
Dept: FAMILY MEDICINE | Facility: CLINIC | Age: 43
End: 2023-03-08

## 2023-03-08 NOTE — LETTER
March 13, 2023    RE: Lupe Sifuentes  [address] [place] [address]        To whom it may concern:    Lupe Sifuentes is a patient at [facility]. She has been under my care since 2019. She has known diagnosis of major depressive disorder and generalized anxiety disorder, for which she has sought treatment over the last 10-15 years.    Lupe is currently appropriately engaged in self care activities and psychotherapy. She has tried several different medications including:    Wellbutrin - did not tolerate due to side effects  Prozac, Paxil, Effexor - does not remember if they were not effective or she did not tolerate, but were not successful medications for her  Sertraline - effective for a period of time, but stopped working  Escitalopram - not effective  Lamictal - effective, but too sedating    She would be a candidate to trial an alternative SNRI medication while awaiting input from psychiatry and based on her prior medication trials I think Pristiq would be the most logical next choice to hopefully minimize side effects that have troubled her with other medication trials.    Feel free to contact our office with any questions or concerns.  Sincerely,  Babita Eli MD

## 2023-03-13 ENCOUNTER — TELEPHONE (OUTPATIENT)
Dept: UROLOGY | Facility: CLINIC | Age: 43
End: 2023-03-13
Payer: COMMERCIAL

## 2023-03-13 NOTE — TELEPHONE ENCOUNTER
Central Prior Authorization Team   Phone: [phone]      Spoke with Mandeep from [employer], they will resend the denial letter

## 2023-03-13 NOTE — TELEPHONE ENCOUNTER
She is calling to let you know that she has side effects from Myrbetriq,sore throat,dry mouth and constant cough.  Please advise next steps.  Trang James LPN

## 2023-03-13 NOTE — TELEPHONE ENCOUNTER
Central Prior Authorization Team   Phone: [phone]      Medication Appeal Initiation    We have initiated an appeal for the requested medication:  Medication: desvenlafaxine (PRISTIQ) 50 MG 24 hr tablet - [employer] DENIED   Appeal Start Date:  3/13/2023  Insurance Company: [employer] - Phone [phone] Fax [phone]  Comments:

## 2023-03-17 NOTE — TELEPHONE ENCOUNTER
DUPLICATE REQUEST FOR MYRBETRIQ. See Encounter from 2/18/2023 for status/outcome and/or next step on coverage on medication.

## 2023-04-09 ASSESSMENT — ANXIETY QUESTIONNAIRES
5. BEING SO RESTLESS THAT IT IS HARD TO SIT STILL: NOT AT ALL
2. NOT BEING ABLE TO STOP OR CONTROL WORRYING: SEVERAL DAYS
4. TROUBLE RELAXING: NEARLY EVERY DAY
7. FEELING AFRAID AS IF SOMETHING AWFUL MIGHT HAPPEN: NOT AT ALL
3. WORRYING TOO MUCH ABOUT DIFFERENT THINGS: NEARLY EVERY DAY
IF YOU CHECKED OFF ANY PROBLEMS ON THIS QUESTIONNAIRE, HOW DIFFICULT HAVE THESE PROBLEMS MADE IT FOR YOU TO DO YOUR WORK, TAKE CARE OF THINGS AT HOME, OR GET ALONG WITH OTHER PEOPLE: SOMEWHAT DIFFICULT
GAD7 TOTAL SCORE: 13
8. IF YOU CHECKED OFF ANY PROBLEMS, HOW DIFFICULT HAVE THESE MADE IT FOR YOU TO DO YOUR WORK, TAKE CARE OF THINGS AT HOME, OR GET ALONG WITH OTHER PEOPLE?: SOMEWHAT DIFFICULT
4. TROUBLE RELAXING: NEARLY EVERY DAY
8. IF YOU CHECKED OFF ANY PROBLEMS, HOW DIFFICULT HAVE THESE MADE IT FOR YOU TO DO YOUR WORK, TAKE CARE OF THINGS AT HOME, OR GET ALONG WITH OTHER PEOPLE?: SOMEWHAT DIFFICULT
6. BECOMING EASILY ANNOYED OR IRRITABLE: NEARLY EVERY DAY
1. FEELING NERVOUS, ANXIOUS, OR ON EDGE: NEARLY EVERY DAY
7. FEELING AFRAID AS IF SOMETHING AWFUL MIGHT HAPPEN: NOT AT ALL
7. FEELING AFRAID AS IF SOMETHING AWFUL MIGHT HAPPEN: NOT AT ALL
5. BEING SO RESTLESS THAT IT IS HARD TO SIT STILL: NOT AT ALL
6. BECOMING EASILY ANNOYED OR IRRITABLE: NEARLY EVERY DAY
IF YOU CHECKED OFF ANY PROBLEMS ON THIS QUESTIONNAIRE, HOW DIFFICULT HAVE THESE PROBLEMS MADE IT FOR YOU TO DO YOUR WORK, TAKE CARE OF THINGS AT HOME, OR GET ALONG WITH OTHER PEOPLE: SOMEWHAT DIFFICULT
GAD7 TOTAL SCORE: 13
3. WORRYING TOO MUCH ABOUT DIFFERENT THINGS: NEARLY EVERY DAY
GAD7 TOTAL SCORE: 13
7. FEELING AFRAID AS IF SOMETHING AWFUL MIGHT HAPPEN: NOT AT ALL
GAD7 TOTAL SCORE: 13
1. FEELING NERVOUS, ANXIOUS, OR ON EDGE: NEARLY EVERY DAY
GAD7 TOTAL SCORE: 13
2. NOT BEING ABLE TO STOP OR CONTROL WORRYING: SEVERAL DAYS
GAD7 TOTAL SCORE: 13

## 2023-04-09 ASSESSMENT — PATIENT HEALTH QUESTIONNAIRE - PHQ9
SUM OF ALL RESPONSES TO PHQ QUESTIONS 1-9: 8
SUM OF ALL RESPONSES TO PHQ QUESTIONS 1-9: 8
10. IF YOU CHECKED OFF ANY PROBLEMS, HOW DIFFICULT HAVE THESE PROBLEMS MADE IT FOR YOU TO DO YOUR WORK, TAKE CARE OF THINGS AT HOME, OR GET ALONG WITH OTHER PEOPLE: VERY DIFFICULT
10. IF YOU CHECKED OFF ANY PROBLEMS, HOW DIFFICULT HAVE THESE PROBLEMS MADE IT FOR YOU TO DO YOUR WORK, TAKE CARE OF THINGS AT HOME, OR GET ALONG WITH OTHER PEOPLE: VERY DIFFICULT
SUM OF ALL RESPONSES TO PHQ QUESTIONS 1-9: 8
SUM OF ALL RESPONSES TO PHQ QUESTIONS 1-9: 8

## 2023-04-10 ENCOUNTER — MYC MEDICAL ADVICE (OUTPATIENT)
Dept: PSYCHIATRY | Facility: CLINIC | Age: 43
End: 2023-04-10
Payer: COMMERCIAL

## 2023-04-10 ENCOUNTER — VIRTUAL VISIT (OUTPATIENT)
Dept: PSYCHIATRY | Facility: CLINIC | Age: 43
End: 2023-04-10
Attending: FAMILY MEDICINE
Payer: COMMERCIAL

## 2023-04-10 ENCOUNTER — VIRTUAL VISIT (OUTPATIENT)
Dept: BEHAVIORAL HEALTH | Facility: CLINIC | Age: 43
End: 2023-04-10
Payer: COMMERCIAL

## 2023-04-10 DIAGNOSIS — F33.1 MAJOR DEPRESSIVE DISORDER, RECURRENT EPISODE, MODERATE (H): Primary | ICD-10-CM

## 2023-04-10 DIAGNOSIS — F41.1 GENERALIZED ANXIETY DISORDER: Primary | ICD-10-CM

## 2023-04-10 DIAGNOSIS — F33.1 MODERATE EPISODE OF RECURRENT MAJOR DEPRESSIVE DISORDER (H): ICD-10-CM

## 2023-04-10 DIAGNOSIS — F41.1 GAD (GENERALIZED ANXIETY DISORDER): ICD-10-CM

## 2023-04-10 PROCEDURE — 90791 PSYCH DIAGNOSTIC EVALUATION: CPT | Mod: VID | Performed by: SOCIAL WORKER

## 2023-04-10 PROCEDURE — 99204 OFFICE O/P NEW MOD 45 MIN: CPT | Mod: VID | Performed by: STUDENT IN AN ORGANIZED HEALTH CARE EDUCATION/TRAINING PROGRAM

## 2023-04-10 ASSESSMENT — COLUMBIA-SUICIDE SEVERITY RATING SCALE - C-SSRS
1. IN THE PAST MONTH, HAVE YOU WISHED YOU WERE DEAD OR WISHED YOU COULD GO TO SLEEP AND NOT WAKE UP?: YES
2. HAVE YOU ACTUALLY HAD ANY THOUGHTS OF KILLING YOURSELF?: NO
6. HAVE YOU EVER DONE ANYTHING, STARTED TO DO ANYTHING, OR PREPARED TO DO ANYTHING TO END YOUR LIFE?: NO
TOTAL  NUMBER OF ABORTED OR SELF INTERRUPTED ATTEMPTS LIFETIME: NO
ATTEMPT LIFETIME: NO
1. HAVE YOU WISHED YOU WERE DEAD OR WISHED YOU COULD GO TO SLEEP AND NOT WAKE UP?: YES
TOTAL  NUMBER OF INTERRUPTED ATTEMPTS LIFETIME: NO

## 2023-04-10 NOTE — PROGRESS NOTES
[facility] Psychiatry Services [facility]      PATIENT'S NAME: Lupe Sifuentes  PREFERRED NAME: Cathie  PRONOUNS: She/her  MRN: [id number]  : 1980  ADDRESS: [address] [place] [address] NUM[facility]:  [id number]  DATE OF SERVICE: 4/10/23  START TIME: 10:01 am  END TIME: 10:48 am  PREFERRED PHONE: [phone]  May we leave a program related message: Yes  SERVICE MODALITY:  Video Visit:      Provider verified identity through the following two step process.  Patient provided:  Patient was verified at admission/transfer    Telemedicine Visit: The patient's condition can be safely assessed and treated via synchronous audio and visual telemedicine encounter.      Reason for Telemedicine Visit: Services only offered telehealth    Originating Site (Patient Location): Patient's home    Distant Site (Provider Location): Provider Remote Setting- Home Office    Consent:  The patient/guardian has verbally consented to: the potential risks and benefits of telemedicine (video visit) versus in person care; bill my insurance or make self-payment for services provided; and responsibility for payment of non-covered services.     Patient would like the video invitation sent by:  My Chart    Mode of Communication:  Video Conference via [employer]well    Distant Location (Provider):  Off-site    As the provider I attest to compliance with applicable laws and regulations related to telemedicine.    UNIVERSAL ADULT Mental Health DIAGNOSTIC ASSESSMENT    Identifying Information:  Patient is a 42 year old,   individual.  [facility]  Patient attended the session alone.    First appointment with patient in [facility] and was advised of the short-term, team based structure of the model including role of [facility] and provider. Patient indicated understanding of the model and agreed to proceed with services as described.    Chief  "Complaint:   The reason for seeking services at this time is: \"Depression and anxiety\".  The problem(s) began 01/01/00.    Noticed depression and anxiety in 2000. Went to college and had kept busy in high school so not really notice prior to that. Had a very healthy/active lifestyle and support from friends and peer group. Everything changed at college. Got [place] and was sick for a long time, not able to make friends. Fam hx of depression. Not realize and not treated for a while. Then reached out for help. Able to stabilize several times. Some episodes of going without medication and was fine. Then needing medication again after pregnancies and potential post partum depression. Back on medication consistently the past 8 years. Zoloft was very helpful but also caused her to feel flat and numb. Tried Lexapro and not helpful. Off medications for 1 month and barely got Pristiq approved and filled three days ago.  Recently feels like depression and anxiety have worsened over the last 6-7 months.  Life continues to have stressors but feels she is more hopeless, depressed, not motivated. Irritable with family and more short-tempered. Feels out of proportion for stressors and persisting. Brain seems to want to be depressed, like it is a habit. Wants to avoid and withdraw but can't due to being a mother and busy. Causing issues at home with her  and children.    SH: denies current or past  SI: thoughts of feeling hopeless and not want to be alive. Feels there is no point but not active thoughts to end life. No plans or intent. No attempts. Discussed crisis resources. Send through [employer]. CSSRS completed. Fam hx SI: denies    Sleep: normal but 7 year old has been clingy. Able to get to sleep but son wakes up several times. Is getting therapeutic support for this.  Appetite: does eat to cope with anxiety, avoid doing things    Tx: Dameon Leon at [facility]. Has been seeing him for the past year, seeing every two weeks. " "Good fit. CBT for anxiety, thoughts, expectations.    Patient has attempted to resolve these concerns in the past through therapy, medications, healthy lifestyle choices.    Social/Family History:  Patient reported they grew up in [place]  .  They were raised by biological parents.  Parents were always together.  She has one older brother. Patient reported that their childhood was \"pretty typical.\" Average. No concerns noted. Denies abuse or neglect.  Patient described their current relationships with family of origin as: still has contact with brother and parents, good connections.     The patient describes their cultural background as .  Cultural influences and impact on patient's life structure, values, norms, and healthcare: I've come to realize that I was raised by my family, and [facility] cultural expectations of women, to fix everyone else's problems (society's, friends' families'). It was my role in my family and I still find myself stuck in it and not taking time to take care of myself or fix my own problems..  Contextual influences on patient's health include: Contextual Factors: Individual Factors nothing identified.    These factors will be addressed in the Preliminary Treatment plan. Patient identified their preferred language to be English. Patient reported they does not need the assistance of an  or other support involved in therapy.     Patient reported experienced significant delays in developmental tasks, such as speech. Brother talked for him so learned to talk for herself..   Patient's highest education level was graduate school.  Patient identified the following learning problems: none reported.   Modifications will not be used to assist communication in therapy. Patient reports they are  able to understand written materials.    Patient reported the following relationship history: started dating at age 15.  Patient's current relationship status is  for 17 " years. One prior relationship in college.  Patient identified their sexual orientation as heterosexual.  Patient reported having 2 child(tre), ages 7 and 10. Patient identified partner; mother; friends; therapist as part of their support system.  Patient identified the quality of these relationships as fair.      Patient's current living/housing situation involves staying in own home/apartment.  The immediate members of family and household include Sadi Sifuentes, 40,Spouse and their two children, and they report that housing is stable.    Patient is currently unemployed.  Stay at home mother. Patient reports their finances are obtained through spouse. Patient does identify finances as a current stressor.      Patient reported that they have not been involved with the legal system. Patient does not report being under probation/ parole/ jurisdiction.    Patient's Strengths and Limitations:  Patient identified the following strengths or resources that will help them succeed in treatment: commitment to health and well being, friends / good social support, family support, insight, intelligence, motivation and strong social skills. Things that may interfere with the patient's success in treatment include: none identified.     Assessments:  The following assessments were completed by patient for this visit:  PHQ9:       1/5/2023     1:43 PM 2/7/2023    12:57 PM 2/17/2023     2:22 PM 3/7/2023     1:41 PM 4/9/2023    11:19 PM   PHQ-9 SCORE   PHQ-9 Total Score MyChart     8 (Mild depression)   PHQ-9 Total Score 6 11 8 9 8    8     GAD7:       1/5/2023     1:43 PM 2/7/2023    12:57 PM 2/17/2023     2:22 PM 3/7/2023     1:41 PM 4/9/2023    11:19 PM   CHANDNI-7 SCORE   Total Score     13 (moderate anxiety)   Total Score 7 7 8 9 13    13     CAGE-AID:       4/9/2023    11:38 PM   CAGE-AID Total Score   Total Score 0    0   Total Score MyChart 0 (A total score of 2 or greater is considered clinically significant)     PROMIS 10-Global  Health (all questions and answers displayed):       4/9/2023    11:37 PM   PROMIS 10   In general, would you say your health is: Good   In general, would you say your quality of life is: Good   In general, how would you rate your physical health? Very good   In general, how would you rate your mental health, including your mood and your ability to think? Poor   In general, how would you rate your satisfaction with your social activities and relationships? Fair   In general, please rate how well you carry out your usual social activities and roles Good   To what extent are you able to carry out your everyday physical activities such as walking, climbing stairs, carrying groceries, or moving a chair? Completely   In the past 7 days, how often have you been bothered by emotional problems such as feeling anxious, depressed, or irritable? Often   In the past 7 days, how would you rate your fatigue on average? Moderate   In the past 7 days, how would you rate your pain on average, where 0 means no pain, and 10 means worst imaginable pain? 2   In general, would you say your health is: 3    3   In general, would you say your quality of life is: 3    3   In general, how would you rate your physical health? 4    4   In general, how would you rate your mental health, including your mood and your ability to think? 1    1   In general, how would you rate your satisfaction with your social activities and relationships? 2    2   In general, please rate how well you carry out your usual social activities and roles. (This includes activities at home, at work and in your community, and responsibilities as a parent, child, spouse, employee, friend, etc.) 3    3   To what extent are you able to carry out your everyday physical activities such as walking, climbing stairs, carrying groceries, or moving a chair? 5    5   In the past 7 days, how often have you been bothered by emotional problems such as feeling anxious, depressed, or  irritable? 4    4   In the past 7 days, how would you rate your fatigue on average? 3    3   In the past 7 days, how would you rate your pain on average, where 0 means no pain, and 10 means worst imaginable pain? 2    2   Global Mental Health Score 8    8   Global Physical Health Score 16    16   PROMIS TOTAL - SUBSCORES 24    24     [place] Suicide Severity Rating Scale (Lifetime/Recent)      4/10/2023     2:29 PM   [place] Suicide Severity Rating (Lifetime/Recent)   1. Wish to be Dead (Lifetime) Y   Wish to be Dead Description (Lifetime) Patient reports feeling there is no point to living   1. Wish to be Dead (Past 1 Month) Y   Wish to be Dead Description (Past 1 Month) Cotinued thoughts of feeling no point to living but not suicidal   2. Non-Specific Active Suicidal Thoughts (Lifetime) N   Actual Attempt (Lifetime) N   Has subject engaged in non-suicidal self-injurious behavior? (Lifetime) N   Interrupted Attempts (Lifetime) N   Aborted or Self-Interrupted Attempt (Lifetime) N   Preparatory Acts or Behavior (Lifetime) N   Calculated C-SSRS Risk Score (Lifetime/Recent) Low Risk       Personal and Family Medical History:  Patient does report a family history of mental health concerns.  Patient reports family history is not on file.     Brother possibly has depression. Paternal Grandmother was severely depressed.     Patient does report Mental Health Diagnosis and/or Treatment. Patient reported the following previous diagnoses which include(s): an Anxiety Disorder and Depression.  Patient reported symptoms began in her 20s when she went away to college.   Patient has received mental health services in the past: therapy with past providers and psychiatry with past providers- for a few years in college ([facility]) and then again but several years ago. .  Psychiatric Hospitalizations: None.  Patient denies a history of civil commitment.  Patient is receiving other mental health services.  These include  psychotherapy with current therapist and primary care provider at [facility].  For follow-up on as needed.         Patient has had a physical exam to rule out medical causes for current symptoms.  Date of last physical exam was within the past year. Client was encouraged to follow up with PCP if symptoms were to develop. The patient has a [facility] Primary Care Provider, who is named Babita Eli.  Patient reports the following current medical concerns: PMDD and no current dental concerns.  Patient denies any issues with pain..   There are not significant appetite / nutritional concerns / weight changes.   Patient does not report a history of head injury / trauma / cognitive impairment.    Patient reports current meds as:   No outpatient medications have been marked as taking for the 4/10/23 encounter (Virtual Visit) with Myla Cole LICSW.       Medication Adherence:  Patient reports taking.  taking prescribed medications as prescribed.    Patient Allergies:    Allergies   Allergen Reactions     Acetaminophen      vomiting      Oxycodone Nausea and Vomiting     vomiting   vomiting        Sulfa Drugs Hives and Rash     hives, PN: LW Reaction: HIVES       Oxycodone-Acetaminophen GI Disturbance and Nausea and Vomiting     Other reaction(s): Nausea and/or Vomiting         Medical History:  No past medical history on file.      Current Mental Status Exam:   Appearance:  Appropriate    Eye Contact:  Good   Psychomotor:  Normal       Gait / station:  no problem  Attitude / Demeanor: Cooperative  Interested  Speech      Rate / Production: Normal/ Responsive      Volume:  Normal  volume      Language:  intact  Mood:   Anxious  Depressed  Irritable   Affect:   Appropriate    Thought Content: Clear   Thought Process: Coherent  Goal Directed  Logical       Associations: No loosening of associations  Insight:   Good   Judgment:  Intact   Orientation:  All  Attention/concentration: Good      Substance  Use:  Patient did report a family history of substance use concerns; see medical history section for details.  Brother is an alcoholic. Patient has not received chemical dependency treatment in the past.  Patient has not ever been to detox.      Patient is not currently receiving any chemical dependency treatment.         Substance History of use Age of first use Date of last use     Pattern and duration of use (include amounts and frequency)   Alcohol currently use   17 04/09/23 REPORTS SUBSTANCE USE: reports using substance 2 times per week and has 2 drinks at a time.   Patient reports heaviest use was never.   Cannabis   never used     REPORTS SUBSTANCE USE: N/A     Amphetamines   never used     REPORTS SUBSTANCE USE: N/A   Cocaine/crack    never used       REPORTS SUBSTANCE USE: N/A   Hallucinogens never used         REPORTS SUBSTANCE USE: N/A   Inhalants never used         REPORTS SUBSTANCE USE: N/A   Heroin never used         REPORTS SUBSTANCE USE: N/A   Other Opiates never used     REPORTS SUBSTANCE USE: N/A   Benzodiazepine   never used     REPORTS SUBSTANCE USE: N/A   Barbiturates never used     REPORTS SUBSTANCE USE: N/A   Over the counter meds never used     REPORTS SUBSTANCE USE: N/A   Caffeine currently use 12   REPORTS SUBSTANCE USE: reports using substance 1 times per day and has 1 to 2 cups of tea or half a cup of coffee at a time.   Patient reports heaviest use was previouslty- cut back last month.   Nicotine  never used     REPORTS SUBSTANCE USE: N/A   Other substances not listed above:  Identify:  never used     REPORTS SUBSTANCE USE: N/A     Patient reported the following problems as a result of their substance use: no problems, not applicable.    Substance Use: No symptoms    Based on the negative CAGE score and clinical interview there  are not indications of drug or alcohol abuse.      Significant Losses / Trauma / Abuse / Neglect Issues:   Patient did not serve in the .  There are  indications or report of significant loss, trauma, abuse or neglect issues related to: are no indications and client denies any losses, trauma, abuse, or neglect concerns.  Concerns for possible neglect are not present.    Safety Assessment:   Patient denies current homicidal ideation and behaviors.  Patient denies current self-injurious ideation and behaviors.    Patient denied risk behaviors associated with substance use.  Patient denies any high risk behaviors associated with mental health symptoms.  Patient reports the following current concerns for their personal safety: None.  Patient reports there are not firearms in the house.    History of Safety Concerns:  Patient denied a history of homicidal ideation.     Patient denied a history of personal safety concerns.    Patient denied a history of assaultive behaviors.    Patient denied a history of sexual assault behaviors.     Patient denied a history of risk behaviors associated with substance use.  Patient denies any history of high risk behaviors associated with mental health symptoms.  Patient reports the following protective factors: dedication to family or friends; regular sleep; purpose; secure attachment; living with other people; daily obligations; commitment to well being; sense of meaning; healthy fear of risky behaviors or pain; financial stability    Risk Plan:  See Recommendations for Safety and Risk Management Plan    Review of Symptoms per patient report:   Depression: Lack of interest, Excessive or inappropriate guilt, Change in energy level, Suicidal ideation, Feelings of hopelessness, Feelings of helplessness, Low self-worth, Ruminations, Irritability, Feeling sad, down, or depressed, Frequent crying, Anger outbursts and flat feeling  Jenn:  No Symptoms Put on Lamictal due to mood swings- PMDD. Fam hx Bipolar: brother possibly  Psychosis: No Symptoms  Anxiety: Excessive worry, Nervousness, Physical complaints, such as headaches,  stomachaches, muscle tension, Social anxiety, Ruminations, Poor concentration and Irritability socially gets overwhelmed, worries a lot about what other people think, ruminating thoughts at meetings, gets red. But still an extrovert when in her comfort zone.  Panic:  No symptoms  Post Traumatic Stress Disorder:  No Symptoms births were traumatic  Eating Disorder: No Symptoms  ADD / ADHD:  No symptoms  Conduct Disorder: No symptoms  Autism Spectrum Disorder: No symptoms  Obsessive Compulsive Disorder: No Symptoms in her 20s- counting things and repetitive behaviors.    Patient reports the following compulsive behaviors and treatment history: None.      Diagnostic Criteria:   Generalized Anxiety Disorder  A. Excessive anxiety and worry about a number of events or activities (such as work or school performance).   B. The person finds it difficult to control the worry.  C. Select 3 or more symptoms (required for diagnosis). Only one item is required in children.   - Restlessness or feeling keyed up or on edge.    - Being easily fatigued.    - Difficulty concentrating or mind going blank.    - Irritability.    - Muscle tension.   D. The focus of the anxiety and worry is not confined to features of an Axis I disorder.  E. The anxiety, worry, or physical symptoms cause clinically significant distress or impairment in social, occupational, or other important areas of functioning.   F. The disturbance is not due to the direct physiological effects of a substance (e.g., a drug of abuse, a medication) or a general medical condition (e.g., hyperthyroidism) and does not occur exclusively during a Mood Disorder, a Psychotic Disorder, or a Pervasive Developmental Disorder.    - The aformentioned symptoms began several year(s) ago and occurs 7 days per week and is experienced as moderate. Major Depressive Disorder  CRITERIA (A-C) REPRESENT A MAJOR DEPRESSIVE EPISODE - SELECT THESE CRITERIA  A) Recurrent episode(s) - symptoms have  been present during the same 2-week period and represent a change from previous functioning 5 or more symptoms (required for diagnosis)   - Depressed mood. Note: In children and adolescents, can be irritable mood.     - Diminished interest or pleasure in all, or almost all, activities.    - Psychomotor activity retardation.    - Fatigue or loss of energy.    - Feelings of worthlessness or inappropriate and excessive guilt.    - Diminished ability to think or concentrate, or indecisiveness.    - Recurrent thoughts of death (not just fear of dying), recurrent suicidal ideation without a specific plan, or a suicide attempt or a specific plan for committing suicide.   B) The symptoms cause clinically significant distress or impairment in social, occupational, or other important areas of functioning  C) The episode is not attributable to the physiological effects of a substance or to another medical condition  D) The occurence of major depressive episode is not better explained by other thought / psychotic disorders  E) There has never been a manic episode or hypomanic episode    Functional Status:  Patient reports the following functional impairments:  management of the household and or completion of tasks, relationship(s) and self-care.     Nonprogrammatic care:  Patient is requesting basic services to address current mental health concerns.    Clinical Summary:  1. Reason for assessment: medication stabilization  .  2. Psychosocial, Cultural and Contextual Factors: Patient lives with her  and two children. 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 She has suppports .  3. Principal DSM5 Diagnoses  (Sustained by DSM5 Criteria Listed Above):   296.32 (F33.1) Major Depressive Disorder, Recurrent Episode, Moderate _  300.02 (F41.1) Generalized Anxiety Disorder.  4. Other Diagnoses that is relevant to services:  N/A  5. Provisional Diagnosis: N/A.  6. Prognosis: Relieve Acute Symptoms.  7. Likely consequences of symptoms if not  treated: N/A.  8. Client strengths include:  caring, creative, educated, goal-focused, good listener, has a previous history of therapy, insightful, intelligent, motivated, open to learning, open to suggestions / feedback, responsible parent, support of family, friends and providers, supportive, wants to learn, willing to ask questions and willing to relate to others .     Recommendations:     1. Plan for Safety and Risk Management:   Safety and Risk: Recommended that patient call 911 or go to the local ED should there be a change in any of these risk factors..          Report to child / adult protection services was NA.     2. Patient's identified mental health concerns with a cultural influence will be addressed by nothing identified at this time.     3. Initial Treatment will focus on:    Depressed Mood - Patient will report improved mood and motivation  Anxiety - Patient will feel less overwhelmed and senstive to her environment.     4. Resources/Service Plan:    services are not indicated.   Modifications to assist communication are not indicated.   Additional disability accommodations are not indicated.      5. Collaboration:   Collaboration / coordination of treatment will be initiated with the following  support professionals: primary care physician and outpatient therapist.      6.  Referrals:   The following referral(s) will be initiated: Patient is connected to therapy and declines other needs at this time. Next Scheduled Appointment: as needed.      A Release of Information has been obtained for the following: N/A.     Emergency Contact was not obtained.      Clinical Substantiation/medical necessity for the above recommendations:  Brief psychiatric intervention to stabilize medications.    7. MARI:    MARI:  Discussed the general effects of drugs and alcohol on health and well-being. Provider gave patient printed information about the  effects of chemical use on their health and well being.  Recommendations:  No apparent concerns at this time.     8. Records:   These were reviewed at time of assessment.   Information in this assessment was obtained from the medical record and  provided by patient who is a good historian.    Patient will have open access to their mental health medical record.    9.   Interactive Complexity: No      Provider Name/ Credentials:  Chance Valdivia  April 10, 2023        Answers for HPI/ROS submitted by the patient on 4/9/2023  If you checked off any problems, how difficult have these problems made it for you to do your work, take care of things at home, or get along with other people?: Very difficult  PHQ9 TOTAL SCORE: 8  CHANDNI 7 TOTAL SCORE: 13

## 2023-04-10 NOTE — PROGRESS NOTES
"Virtual Visit Details    Type of service:  Video Visit   Video Start Time: 10:56 AM  Video End Time:11:33 AM    Originating Location (pt. Location): Home    Distant Location (provider location):  Off-site  Platform used for Video Visit: [facility]          DIAGNOSTIC PSYCHIATRIC ASSESSMENT     Name:  Lupe Sifuentes  : 1980     Patient is a 42 year old year old White, female  who presents for intake and medication management with [facility].  Patient was referred by PCP.   Patient attended the session alone.     Patient care team: Patient Care Team:  Babita Eli MD as PCP - General (Family Medicine)  Monika Rolle PA-C as Physician Assistant    Available records in [facility] and/or Care Everywhere were reviewed today.   Per PCP on date of referral: \"refractory depression and anxiety, multiple failed medication trials\"    LANGUAGE OR COMMUNICATION BARRIERS   Are there language or communication issues or need for modification in treatment? No   Are there ethnic, cultural or Baptism factors that may be relevant for therapy? No  Client identified their preferred language to be fluent English in conversational context  Does the client need the assistance of an  or other support involved in therapy? No                                                 CHIEF COMPLAINT   Patient is a 42 year old,  White, female who presents for initial psychiatric evaluation with the [facility] ([facility]) for evaluation of depression and anxiety.      HISTORY OF PRESENT ILLNESS     Per [facility] Myla Cole during today's team-based visit:   \"Noticed depression and anxiety in . Went to college and had kept busy in high school so not really notice prior to that. Had a very healthy/active lifestyle and support from friends and peer group. Everything changed at college. Got Brantley and was sick for a long time, not able to make friends. Fam hx of depression. Not realize and not treated for a while. Then " "reached out for help. Able to stabilize several times. Some episodes of going without medication and was fine. Then needing medication again after pregnancies and potential post partum depression. Back on medication consistently the past 8 years. Zoloft was very helpful but also caused her to feel flat and numb. Tried Lexapro and not helpful. Off medications for 1 month and barely got Pristiq approved and filled three days ago.  Recently feels like depression and anxiety have worsened over the last 6-7 months.  Life continues to have stressors but feels she is more hopeless, depressed, not motivated. Irritable with family and more short-tempered. Feels out of proportion for stressors and persisting. Brain seems to want to be depressed, like it is a habit. Wants to avoid and withdraw but can't due to being a mother and busy. Causing issues at home with her  and children.  SH: denies current or past  SI: thoughts of feeling hopeless and not want to be alive. Feels there is no point but not active thoughts to end life. No plans or intent. No attempts. Discussed crisis resources. Send through [employer]. Fam hx SI: denies  Sleep: normal but 7 year old has been clingy. Able to get to sleep but son wakes up several times. Is getting therapeutic support.  Appetite: does eat to cope with anxiety, avoid doing things  Tx: Dameon Leon at [facility]. Has been seeing him for the past year, seeing every two weeks. Good fit. CBT for anxiety, thoughts, expectations.\"    ---Psychiatry Evaluation---    Pt agrees with assessment above.   Pt started Pristiq 3 days ago after going back and forth with a PA. She was hoping to have had more time with Pristiq before this appt.  She says she's always had anxiety but depression now is \"much worse. Much worse.\" She says the last time she felt really healthy and ok was \"I don't know. I've been on medication since Toni was born, the last 7 and 1/2 years.\" She says there have been periods " "during the past 7-8 years where she felt \"pretty good\" with sertraline.   Pt says she is \"pretty healthy\" aside from overactive bladder and back px from competitive gymnastics. She says she often over-exerts herself on home renovations which affects her pain and subsequently her mood.     Her son interrupts her sleep. She normally can sleep without problems with while they are trying to help him sleep in his own bed she is often up overnight. Her  travels for work so she is often doing the overnight work on her own.     Pt reports two weeks of PMS-like mood swings and then two weeks of \"feeling pretty good.\" She says these mood swings have been related to her cycle. For most of her adult life she reports \"major mood swings\" starting ~9 days before her period starts and then resolving shortly after her period starts.   Pt wonders about hormone testing due to history of cystic acne and problems with heavy periods.     PSYCHIATRIC REVIEW OF SYSTEMS:   Per [facility] Myla Cole during today's team-based visit:   PHQ9 score is 8 indicating mild depression.   Suicidal ideation:  No SI currently and feeling hopeless, thoughts of death.  GAD7 score is is 13 indicating moderate anxiety.  Depression:     Lack of interest, Excessive or inappropriate guilt, Change in energy level, Suicidal ideation, Feelings of hopelessness, Feelings of helplessness, Low self-worth, Ruminations, Irritability, Feeling sad, down, or depressed, Frequent crying, Anger outbursts and flat feeling  Jenn:             No Symptoms Put on Lamictal due to mood swings- PMDD. Fam hx Bipolar: brother possibly  Psychosis:       No Symptoms  Anxiety:           Excessive worry, Nervousness, Physical complaints, such as headaches, stomachaches, muscle tension, Social anxiety, Ruminations, Poor concentration and Irritability socially gets overwhelmed, worries a lot about what other people think, ruminating thoughts at meetings, gets red. But still an " "extrovert when in her comfort zone.  Panic:              No symptoms  Post Traumatic Stress Disorder:  No Symptoms births were traumatic  Eating Disorder:          No Symptoms  ADD / ADHD:              No symptoms  Conduct Disorder:       No symptoms  Autism Spectrum Disorder:     No symptoms  Obsessive Compulsive Disorder:       No Symptoms in her 20s- counting things and repetitive behaviors.    SUBSTANCE USE HISTORY    Tobacco use: none  Caffeine:  Yes  <1 cups/day of coffee  Current alcohol:  1-2 drinks/1-2x weekly  Current substance use: denies  Past use alcohol/substance use: denies    PSYCHIATRIC HISTORY:   Past psychiatric diagnoses:   Anxiety   Depression    Past psychiatric history and treatment:  Previous psychiatry: Brief 8 years ago.  Previous therapist: ind therapy through [facility]  History of Psychiatric Hospitalizations:   - Inpatient: none  - IOP/PHP/Day treatment: none  History of Suicidal Ideation: SI many years ago but no plan  History of Suicide Attempts:  none    History of Self-injurious Behavior: none  History of impulsivity: No   History of Violence/Aggression: No   History of Commitment? No   Electroconvulsive Therapy (ECT) or Transcranial Magnetic Stimulation (TMS): No   PharmacogenomicTesting (such as GeneSight): No     SOCIAL HISTORY                                         Per [facility] Myla Cole during today's team-based visit:  \"Patient reported they grew up in [place]  .  They were raised by biological parents.  Parents were always together.  She has one older brother. Patient reported that their childhood was \"pretty typical.\" Average. No concerns noted. Denies abuse or neglect.  Patient described their current relationships with family of origin as: still has contact with brother and parents, good connections.   The patient describes their cultural background as .  Cultural influences and impact on patient's life structure, values, norms, and healthcare: I've come to " realize that I was raised by my family, and [facility] cultural expectations of women, to fix everyone else's problems (society's, friends' families'). It was my role in my family and I still find myself stuck in it and not taking time to take care of myself or fix my own problems..  Contextual influences on patient's health include: Contextual Factors: Individual Factors nothing identified.    These factors will be addressed in the Preliminary Treatment plan. Patient identified their preferred language to be English. Patient reported they does not need the assistance of an  or other support involved in therapy.   Patient reported experienced significant delays in developmental tasks, such as speech. Brother talked for him so learned to talk for herself..   Patient's highest education level was graduate school.  Patient identified the following learning problems: none reported.   Modifications will not be used to assist communication in therapy. Patient reports they are  able to understand written materials.  Patient reported the following relationship history: started dating at age 15.  Patient's current relationship status is  for 17 years. One prior relationship in college.  Patient identified their sexual orientation as heterosexual.  Patient reported having 2 child(tre), ages 7 and 10. Patient identified partner; mother; friends; therapist as part of their support system.  Patient identified the quality of these relationships as fair.    Patient's current living/housing situation involves staying in own home/apartment.  The immediate members of family and household include Sadi Sifuentes, 40,Spouse and their two children, and they report that housing is stable.  Patient is currently unemployed.  Stay at home mother. Patient reports their finances are obtained through spouse. Patient does identify finances as a current stressor.    Patient reported that they have not been involved with the legal  "system. Patient does not report being under probation/ parole/ jurisdiction.\"    MEDICATIONS                                                                                              Current medications reviewed today and are noted below.   Current psychotropic medications:   Desvenlafaxine 50mg    Past psychotropic medications:  Escitalopram  Sertraline  Alprazolam  Lamotrigine - helped mood but SEs- disoriented  Bupropion - doesn't recall, thinks last took 20 years ago  Venlafaxine   Celexa  Trazodone    Supplements:   See below      Not reviewed    Current Outpatient Medications   Medication Sig     benzoyl peroxide 5 % topical gel Apply topically to face once daily  Indications: Common Acne     desvenlafaxine (PRISTIQ) 50 MG 24 hr tablet Take 1 tablet (50 mg) by mouth daily     levonorgestrel (MIRENA) 20 MCG/DAY IUD 1 each by Intrauterine route     tretinoin (RETIN-A) 0.025 % external cream Apply a thin layer to face 2 nights each week and slowly increase to nightly use as tolerated     No current facility-administered medications for this visit.        VITALS   There were no vitals taken for this visit.    Pulse Readings from Last 5 Encounters:   03/07/23 64   02/17/23 80   02/07/23 69   01/05/23 60     Wt Readings from Last 5 Encounters:   02/17/23 63.5 kg (140 lb)   02/07/23 68 kg (150 lb)   01/05/23 68.1 kg (150 lb 1.3 oz)     BP Readings from Last 5 Encounters:   03/07/23 118/78   02/17/23 110/62   02/07/23 109/72   01/05/23 114/65       LABS & IMAGING                                                                                                                Recent available labs reviewed today.    No lab results found.  No lab results found.  No lab results found.  ECG none on file    ALLERGY & IMMUNIZATIONS       Allergies   Allergen Reactions     Acetaminophen      vomiting      Oxycodone Nausea and Vomiting     vomiting   vomiting        Sulfa Drugs Hives and Rash     hives, PN: LW Reaction: " HIVES       Oxycodone-Acetaminophen GI Disturbance and Nausea and Vomiting     Other reaction(s): Nausea and/or Vomiting         MEDICAL & SURGICAL HISTORY    Reviewed past medical and surgical history today.   Pregnant - denies.   Hx seizures or head injuries - No    No past medical history on file.    FAMILY MEDICAL AND PSYCHIATRIC HISTORY   No family history on file.    Family history of sudden or unexplained death or an event requiring resuscitation in children or young adults, cardiac arrhythmias (eg, Yessica-Parkinson-White syndrome), long QT syndrome, catecholaminergic paroxysmal ventricular tachycardia, Brugada syndrome, arrhythmogenic right ventricular dysplasia, hypertrophic cardiomyopathy, dilated cardiomyopathy, or Marfan syndrome?  No    Family psychiatric history: brother with BAD; grandparent with depression; daughter with ADHD  Family substance use history:  Brother with alcoholism; uncle with ETOH problems  Family suicide history: No  Medications family responded to: depressed grandparent with hx ECT; daughter with guanfacine for ADHD     MEDICAL REVIEW OF SYSTEMS:   10 systems (general, cardiovascular, respiratory, eyes, ENT, endocrine, GI, , M/S, neurological) were reviewed. Most pertinent finding(s) is/are: chronic back px. The remaining systems are all unremarkable.    MENTAL STATUS EXAM:     Alertness: alert  and oriented  Appearance: adequately groomed  Behavior/Demeanor: cooperative, pleasant and calm, with good eye contact   Speech: normal and regular rate and rhythm  Language: intact and no problems  Psychomotor: normal or unremarkable  Mood: depressed  Affect: full range and appropriate; was congruent to mood; was congruent to content  Thought Process/Associations: unremarkable  Thought Content:  Reports none;  Denies suicidal ideation, violent ideation and delusions  Perception:  Reports none;  Denies auditory hallucinations and visual hallucinations  Insight: intact  Judgment:  intact  Cognition: does  appear grossly intact; formal cognitive testing was not done  Gait and Station: PILAR    RISK AND PROTECTIVE FACTORS     Static Risk Factors: history of MH diagnoses and/or treatment  Firearms/Weapons Access: No: Patient denies  Dynamic Risk Factors: anxiety, no perceived reasons for living, mental health stigma and feelings of shame    Protective Factors: hope for the future, compliance with medication, medical compliance, insight, problem solving ability, future oriented, healthy intimate relationships, restricted access to means, resilience, perceived internal locus of control, social support, access to care as needed, motivation and readiness for change, reasons for living, effective coping strategies and displaying help seeking behavior    SAFETY ASSESSMENT     Based on review of above risk and protective factors and today's exam, pt is at low elevated risk of harm to self or others. She does not meet criteria for a 72 hr hold and remains appropriate for ongoing outpatient care. The patient convincingly denies suicidality today. There was no deceit detected, and the patient presented in a manner that was believable. Local community safety resources printed and reviewed for patient to use if needed.    Recommended that patient call 911 or go to the local ED should there be a change in any of these risk factors.    DSM 5 DIAGNOSIS     296.32 (F33.1) Major Depressive Disorder, Recurrent Episode, Moderate _  300.02 (F41.1) Generalized Anxiety Disorder    DIFFERENTIAL DIAGNOSIS: r/o mood disorder/bipolar depression, PMDD by history    Medical comorbidities impacting or contributing to clinical picture: None noted  Known issue that I take into account for their medical decisions, no current exacerbations or new concerns.    ASSESSMENT AND PLAN      ASSESSMENT:  Lupe Sifuentes is a 42 year old White, female who presents for initial visit with [facility] ([facility]) for  medication management. Pt with hx of chronic depression and anxiety with r/o PMDD and a family hx of bipolar disorder presents for evaluation and treatment of her depression, which has been worse the past 6-7 months. She describes irritability, low mood, low energy, guilt, hopelessness. She started Pristiq three days ago with PCP. We discussed giving Pristiq time before evaluating for effectiveness. We discussed her family hx of BAD; it is possible this is more of a bipolar depression presentation. Consider mood stabilizer if needed. Will also review PMDD sx in further detail next appt due to her hx of reported depressive sx occurring prior to cycle and resolving shortly after starting period. Pt denies SI today and will rtc in 3-4 weeks.     TREATMENT PLAN: Medication side effects and alternatives reviewed. Health promotion activities recommended and reviewed. All questions addressed. Education and counseling completed regarding risks and benefits of medications and psychotherapy options. Collaborative Care Psychiatry Service model reviewed today. Recommend therapy for additional support. Safety plan reviewed as indicated.     MEDICATIONS:   -continue PRISTIQ 50mg daily    LABS/RADS:   -cbc, cmp, tsh, vitd, vitb12    PATIENT STATUS:  [facility] offer care a specialty service for Primary Care Providers in the [facility] that seek to optimize psychotropic medications for unstable patients.  Once medications have been optimized, our providers discharge the patient back to the referring Primary Care Provider for ongoing medication management.  This type of system allows our providers to serve a high volume of patients.   -Pt will be seen for continued medication stabilization in [facility].    PSYCHOSOCIAL:   -continue therapy; consider weekly  -Follow up with primary care provider as planned or for acute medical concerns.    PSYCHOEDUCATION:  Medication side effects and alternatives reviewed. Health  promotion activities recommended and reviewed today. All questions addressed. Education and counseling completed regarding risks and benefits of medications and psychotherapy options.  Consent provided by patient/guardian  Call the psychiatric nurse line with medication questions or concerns at [phone].  Coineyhart may be used to communicate with your provider, but this is not intended to be used for emergencies.  BLACK BOX WARNING: Discussed the Food and Drug Administration (FDA) requires that all antidepressants carry a warning that some children, adolescents and young adults may be at increased risk of suicide when taking antidepressants. Anyone taking an antidepressant should be watched closely for worsening depression or unusual behavior especially in the first few weeks after starting an SSRI. Keep in mind, antidepressants are more likely to reduce suicide risk in the long run by improving mood.   [place] [facility].gov is information for patients.  It is run by the [employer] and it contains information about all disorders, diseases and all medications.      FOLLOW-UP: Follow up in 3-4 weeks    1. Continue all other treatments (including medications) per primary care provider and/or specialists.   2. To schedule individual or family therapy, call [facility] at [phone].   3. Follow up with primary care provider as planned or for acute medical concerns.  4. Call the psychiatric nurse line with medication questions or concerns at [phone] or [phone].  5. Coineyhart may be used to communicate with your care team, but this is not intended to be used for emergencies.    CRISIS RESOURCES:    1. Present to the Emergency Department as needed or call after hours crisis line at [phone] or [phone].   2. [place] Crisis Text Line: Text [place] to 789994.  3. Suicide LifeLine Chat: suicidepreventionlifeline.org/chat/.  4. National Suicide Prevention Lifeline: 981.958.7389  (TTY: 843.525.9090). Call anytime for help.  (www.suicidepreventionlifeline.org)  5. National [place] on Mental Illness (www.ryan.org): 398.621.7519 or 493-925-8095.  6. Mental Health Association (www.mentalhealth.org): 230.843.9614 or 836-976-7943.    ADMINISTRATIVE BILLING:    Time spent interviewing patient, reviewing referral documents, obtaining and reviewing outside records, communication with other health specialists, and preparing this report on today's date.    Signed:   Jaycee Hernandez DNP, PMHNP-BC  Collaborative Care Psychiatry Service ([facility])

## 2023-04-10 NOTE — PATIENT INSTRUCTIONS
Quang Brand,    Thank you for our time together today in Collaborative Care Psychiatry Service (CCPS). [facility] provides brief psychiatric medication stabilization to patients referred by their Primary Care Providers. Patients are typically seen in CCPS for up to 4 appointments and then referred back to the PCP for ongoing refills unless longer term medication management by a specialist is indicated. If I believe you will benefit from long-term psychiatric care I will discuss this with you. If you are interested in seeing a psychiatrist or psychiatric nurse practitioner long-term, please send me a message in [employer] so we can refer you appropriately.     Treatment Plan:  Continue Pristiq 50mg daily for depression  Complete labs at the closest [place] lab to get CBC, CMP, TSH, Vit D, Vit B12 daily  Call [phone] to schedule follow up with me in 3-4 weeks.  You can call the above number to make appointments, leave a message with our nursing team, and inquire about any mental health referrals I have placed.  Please call your pharmacy to request a refill of your medicines listed above if needed between appointments.     ZUHAIR Rao  Formerly [facility] Psychiatry Service  [facility] Psychiatry Service  What to Expect  Here's what to expect from your Collaborative Care Psychiatry Service (CCPS).   About CCPS  CCPS means 2 people work together to help you get better. You'll meet with a behavioral health clinician and a psychiatric doctor. A behavioral health clinician helps people with mental health problems by talking with them. A psychiatric doctor helps people by giving them medicine.  How it works  At every visit, you'll see the behavioral health clinician (BHC) first. They'll talk with you about how you're doing and teach you how to feel better.   Then you'll see the psychiatric doctor. This doctor can help you deal with troubling thoughts and feelings by giving you medicine.  "They'll make sure you know the plan for your care.   CCPS usually takes 3 to 6 visits. If you need more visits, we may have you start seeing a different psychiatric doctor for ongoing care.  If you have any questions or concerns, we'll be glad to talk with you.  About visits  Be open  At your visits, please talk openly about your problems. It may feel hard, but it's the best way for us to help you.  Cancelling visits  If you can't come to your visit, please call us right away at [phone]. If you don't cancel at least 24 hours (1 full day) before your visit, that's \"late cancellation.\"  Being late to visits  Being very late is the same as not showing up. You will be a \"no show\" if:  Your appointment starts with a C, and you're more than 15 minutes late for a 30-minute (half hour) visit. This will also cancel your appointment with the psychiatric doctor.  Your appointment is with a psychiatric doctor only, and you're more than 15 minutes late for a 30-minute (half hour) visit.  Your appointment is with a psychiatric doctor only, and you're more than 30 minutes late for a 60-minute (full hour) visit.  If you cancel late or don't show up 2 times within 6 months, we may end your care.   Getting help between visits  If you need help between visits, you can call us Monday to Friday from 8 a.m. to 4:30 p.m. at [phone].  Emergency care  Call 911 or go to the nearest emergency department if your life or someone else's life is in danger.  Call 988 anytime to reach the national Suicide and Crisis hotline.  Medicine refills  To refill your medicine, call your pharmacy. You can also call [facility]'s [facility] at [phone], Monday to Friday, 8 a.m. to 4:30 p.m. It can take 1 to 3 business days to get a refill.   Forms, letters, and tests  You may have papers to fill out, like FMLA, short-term disability, and workability. We can help you with these forms at your visits, but you must have an " appointment. You may need more than 1 visit for this, to be in an intensive therapy program, or both.  Before we can give you medicine for ADHD, we may refer you to get tested for it or confirm it another way.  We may not be able to give you an emotional support animal letter.  We don't do mental health checks ordered by the court.   We don't do mental health testing, but we can refer you to get tested.   Thank you for choosing us for your care.  For informational purposes only. Not to replace the advice of your health care provider. Copyright   2022 [facility]. All rights reserved. [employer] FP [id number] - 12/22.

## 2023-04-10 NOTE — NURSING NOTE
Is the patient currently in the state of [place]? YES    Visit mode:VIDEO    If the visit is dropped, the patient can be reconnected by: VIDEO VISIT: Send to e-mail at: [email or web address]    Will anyone else be joining the visit? NO      How would you like to obtain your AVS? MyChart    Are changes needed to the allergy or medication list? YES: Pt stated they are not allergic to Tylenol, They only have the problem with Oxycodone. VF encouraged patient to share that with PCP so the allergy list could be amended.    Reason for visit: new patient    Care team has reviewed attendance agreement with patient. Patient advised that two failed appointments within 6 months may lead to termination of current episode of care.     Steph Hancock VF

## 2023-04-13 ENCOUNTER — LAB (OUTPATIENT)
Dept: LAB | Facility: CLINIC | Age: 43
End: 2023-04-13
Payer: COMMERCIAL

## 2023-04-13 DIAGNOSIS — F33.1 MAJOR DEPRESSIVE DISORDER, RECURRENT EPISODE, MODERATE (H): ICD-10-CM

## 2023-04-13 LAB
ALBUMIN SERPL BCG-MCNC: 4.3 G/DL (ref 3.5–5.2)
ALP SERPL-CCNC: 40 U/L (ref 35–104)
ALT SERPL W P-5'-P-CCNC: 7 U/L (ref 10–35)
ANION GAP SERPL CALCULATED.3IONS-SCNC: 8 MMOL/L (ref 7–15)
AST SERPL W P-5'-P-CCNC: 15 U/L (ref 10–35)
BASOPHILS # BLD AUTO: 0 10E3/UL (ref 0–0.2)
BASOPHILS NFR BLD AUTO: 1 %
BILIRUB SERPL-MCNC: 0.5 MG/DL
BUN SERPL-MCNC: 8.1 MG/DL (ref 6–20)
CALCIUM SERPL-MCNC: 9.2 MG/DL (ref 8.6–10)
CHLORIDE SERPL-SCNC: 105 MMOL/L (ref 98–107)
CREAT SERPL-MCNC: 0.8 MG/DL (ref 0.51–0.95)
DEPRECATED CALCIDIOL+CALCIFEROL SERPL-MC: 41 UG/L (ref 20–75)
DEPRECATED HCO3 PLAS-SCNC: 28 MMOL/L (ref 22–29)
EOSINOPHIL # BLD AUTO: 0.1 10E3/UL (ref 0–0.7)
EOSINOPHIL NFR BLD AUTO: 1 %
ERYTHROCYTE [DISTWIDTH] IN BLOOD BY AUTOMATED COUNT: 12.7 % (ref 10–15)
GFR SERPL CREATININE-BSD FRML MDRD: >90 ML/MIN/1.73M2
GLUCOSE SERPL-MCNC: 108 MG/DL (ref 70–99)
HCT VFR BLD AUTO: 38.4 % (ref 35–47)
HGB BLD-MCNC: 12.8 G/DL (ref 11.7–15.7)
IMM GRANULOCYTES # BLD: 0 10E3/UL
IMM GRANULOCYTES NFR BLD: 1 %
LYMPHOCYTES # BLD AUTO: 1.3 10E3/UL (ref 0.8–5.3)
LYMPHOCYTES NFR BLD AUTO: 22 %
MCH RBC QN AUTO: 29.3 PG (ref 26.5–33)
MCHC RBC AUTO-ENTMCNC: 33.3 G/DL (ref 31.5–36.5)
MCV RBC AUTO: 88 FL (ref 78–100)
MONOCYTES # BLD AUTO: 0.4 10E3/UL (ref 0–1.3)
MONOCYTES NFR BLD AUTO: 6 %
NEUTROPHILS # BLD AUTO: 4 10E3/UL (ref 1.6–8.3)
NEUTROPHILS NFR BLD AUTO: 69 %
NRBC # BLD AUTO: 0 10E3/UL
NRBC BLD AUTO-RTO: 0 /100
PLATELET # BLD AUTO: 173 10E3/UL (ref 150–450)
POTASSIUM SERPL-SCNC: 3.8 MMOL/L (ref 3.4–5.3)
PROT SERPL-MCNC: 6.3 G/DL (ref 6.4–8.3)
RBC # BLD AUTO: 4.37 10E6/UL (ref 3.8–5.2)
SODIUM SERPL-SCNC: 141 MMOL/L (ref 136–145)
TSH SERPL DL<=0.005 MIU/L-ACNC: 0.5 UIU/ML (ref 0.3–4.2)
VIT B12 SERPL-MCNC: 340 PG/ML (ref 232–1245)
WBC # BLD AUTO: 5.8 10E3/UL (ref 4–11)

## 2023-04-13 PROCEDURE — 80053 COMPREHEN METABOLIC PANEL: CPT

## 2023-04-13 PROCEDURE — 84443 ASSAY THYROID STIM HORMONE: CPT

## 2023-04-13 PROCEDURE — 82607 VITAMIN B-12: CPT

## 2023-04-13 PROCEDURE — 36415 COLL VENOUS BLD VENIPUNCTURE: CPT

## 2023-04-13 PROCEDURE — 82306 VITAMIN D 25 HYDROXY: CPT

## 2023-04-13 PROCEDURE — 85025 COMPLETE CBC W/AUTO DIFF WBC: CPT

## 2023-04-16 ENCOUNTER — HEALTH MAINTENANCE LETTER (OUTPATIENT)
Age: 43
End: 2023-04-16

## 2023-05-09 ENCOUNTER — VIRTUAL VISIT (OUTPATIENT)
Dept: BEHAVIORAL HEALTH | Facility: CLINIC | Age: 43
End: 2023-05-09
Payer: COMMERCIAL

## 2023-05-09 ENCOUNTER — VIRTUAL VISIT (OUTPATIENT)
Dept: PSYCHIATRY | Facility: CLINIC | Age: 43
End: 2023-05-09
Payer: COMMERCIAL

## 2023-05-09 DIAGNOSIS — F33.1 MAJOR DEPRESSIVE DISORDER, RECURRENT EPISODE, MODERATE (H): Primary | ICD-10-CM

## 2023-05-09 DIAGNOSIS — F41.1 GENERALIZED ANXIETY DISORDER: Primary | ICD-10-CM

## 2023-05-09 DIAGNOSIS — F41.1 GAD (GENERALIZED ANXIETY DISORDER): ICD-10-CM

## 2023-05-09 DIAGNOSIS — F41.8 DEPRESSION WITH ANXIETY: ICD-10-CM

## 2023-05-09 DIAGNOSIS — F33.1 MODERATE EPISODE OF RECURRENT MAJOR DEPRESSIVE DISORDER (H): ICD-10-CM

## 2023-05-09 PROCEDURE — 90832 PSYTX W PT 30 MINUTES: CPT | Mod: VID | Performed by: SOCIAL WORKER

## 2023-05-09 PROCEDURE — 99214 OFFICE O/P EST MOD 30 MIN: CPT | Mod: VID | Performed by: STUDENT IN AN ORGANIZED HEALTH CARE EDUCATION/TRAINING PROGRAM

## 2023-05-09 RX ORDER — DESVENLAFAXINE 50 MG/1
50 TABLET, FILM COATED, EXTENDED RELEASE ORAL DAILY
Qty: 90 TABLET | Refills: 0 | Status: SHIPPED | OUTPATIENT
Start: 2023-05-09 | End: 2023-07-11

## 2023-05-09 ASSESSMENT — ANXIETY QUESTIONNAIRES
7. FEELING AFRAID AS IF SOMETHING AWFUL MIGHT HAPPEN: NOT AT ALL
6. BECOMING EASILY ANNOYED OR IRRITABLE: MORE THAN HALF THE DAYS
2. NOT BEING ABLE TO STOP OR CONTROL WORRYING: SEVERAL DAYS
8. IF YOU CHECKED OFF ANY PROBLEMS, HOW DIFFICULT HAVE THESE MADE IT FOR YOU TO DO YOUR WORK, TAKE CARE OF THINGS AT HOME, OR GET ALONG WITH OTHER PEOPLE?: SOMEWHAT DIFFICULT
8. IF YOU CHECKED OFF ANY PROBLEMS, HOW DIFFICULT HAVE THESE MADE IT FOR YOU TO DO YOUR WORK, TAKE CARE OF THINGS AT HOME, OR GET ALONG WITH OTHER PEOPLE?: SOMEWHAT DIFFICULT
GAD7 TOTAL SCORE: 7
7. FEELING AFRAID AS IF SOMETHING AWFUL MIGHT HAPPEN: NOT AT ALL
5. BEING SO RESTLESS THAT IT IS HARD TO SIT STILL: NOT AT ALL
7. FEELING AFRAID AS IF SOMETHING AWFUL MIGHT HAPPEN: NOT AT ALL
IF YOU CHECKED OFF ANY PROBLEMS ON THIS QUESTIONNAIRE, HOW DIFFICULT HAVE THESE PROBLEMS MADE IT FOR YOU TO DO YOUR WORK, TAKE CARE OF THINGS AT HOME, OR GET ALONG WITH OTHER PEOPLE: SOMEWHAT DIFFICULT
1. FEELING NERVOUS, ANXIOUS, OR ON EDGE: MORE THAN HALF THE DAYS
2. NOT BEING ABLE TO STOP OR CONTROL WORRYING: SEVERAL DAYS
IF YOU CHECKED OFF ANY PROBLEMS ON THIS QUESTIONNAIRE, HOW DIFFICULT HAVE THESE PROBLEMS MADE IT FOR YOU TO DO YOUR WORK, TAKE CARE OF THINGS AT HOME, OR GET ALONG WITH OTHER PEOPLE: SOMEWHAT DIFFICULT
6. BECOMING EASILY ANNOYED OR IRRITABLE: MORE THAN HALF THE DAYS
GAD7 TOTAL SCORE: 7
4. TROUBLE RELAXING: SEVERAL DAYS
GAD7 TOTAL SCORE: 7
3. WORRYING TOO MUCH ABOUT DIFFERENT THINGS: SEVERAL DAYS
4. TROUBLE RELAXING: SEVERAL DAYS
GAD7 TOTAL SCORE: 7
1. FEELING NERVOUS, ANXIOUS, OR ON EDGE: MORE THAN HALF THE DAYS
3. WORRYING TOO MUCH ABOUT DIFFERENT THINGS: SEVERAL DAYS
7. FEELING AFRAID AS IF SOMETHING AWFUL MIGHT HAPPEN: NOT AT ALL
GAD7 TOTAL SCORE: 7
5. BEING SO RESTLESS THAT IT IS HARD TO SIT STILL: NOT AT ALL
GAD7 TOTAL SCORE: 7

## 2023-05-09 ASSESSMENT — PATIENT HEALTH QUESTIONNAIRE - PHQ9
SUM OF ALL RESPONSES TO PHQ QUESTIONS 1-9: 3
SUM OF ALL RESPONSES TO PHQ QUESTIONS 1-9: 3
10. IF YOU CHECKED OFF ANY PROBLEMS, HOW DIFFICULT HAVE THESE PROBLEMS MADE IT FOR YOU TO DO YOUR WORK, TAKE CARE OF THINGS AT HOME, OR GET ALONG WITH OTHER PEOPLE: SOMEWHAT DIFFICULT
SUM OF ALL RESPONSES TO PHQ QUESTIONS 1-9: 3
10. IF YOU CHECKED OFF ANY PROBLEMS, HOW DIFFICULT HAVE THESE PROBLEMS MADE IT FOR YOU TO DO YOUR WORK, TAKE CARE OF THINGS AT HOME, OR GET ALONG WITH OTHER PEOPLE: SOMEWHAT DIFFICULT
SUM OF ALL RESPONSES TO PHQ QUESTIONS 1-9: 3

## 2023-05-09 NOTE — PROGRESS NOTES
"Virtual Visit Details    Type of service:  Video Visit   Video Start Time:   Video End Time:11:59 AM    Originating Location (pt. Location): Home    Distant Location (provider location):  Off-site  Platform used for Video Visit: [facility] PSYCHIATRY SERVICE FOLLOW-UP     Name:  Lupe Sifuentes  : 1980     Patient is a 42 year old year old White, female  who presents for follow-up medication management with [facility].  Patient was initially referred by their PCP. Patient attended the session alone.     Patient care team: Patient Care Team:  Babita Eli MD as PCP - General (Family Medicine)  Monika Rolle PA-C as Physician Assistant  Marci Hernandez NP as Assigned Neuroscience Provider    INTERIM HISTORY   Pt was last seen in [facility] for intake on 10 Apr 23. At that time, the plan included continue pristiq 50mg.    Interim pt communication:  none    Available records were reviewed prior to visit.    HISTORY OF PRESENT ILLNESS     Per [facility] Myla Cole during today's team-based visit: \"MH Update: \"pretty good.\" Medication does seem to be \"kicking in.\" Mood is starting to level out, less of the lows and really depressed feelings. Not noticing side effects. Improved weather and getting outside more is helping.   Expectations with mental health and medications.  Hopeful that couples counseling will help. Still overwhelming with life situations but related to life factors. Able to be more calm and less hopeless.  Stressors: ongoing family/marital stressors  Tx: Dameon Leon at [facility]. Seeing every two weeks for the past year. Helpful for individual needs. Started couples counseling with Jarret Palomo ([facility]) a month ago and had second appointment so getting going on the work. Needs help with  owning his own stuff.  Etoh: rarely, 1-2 drinks/week  Substance: denies  Nicotine: denies, non smoker  Caffeine: decreased to 2 cups of tea/coffee per day  Most Important: check " "in on medications. Feeling stable for now.\"    ---Psychiatry Update---  Pt has perceived that her depression is improving since starting pristiq. She perceives lows are not as low. She feels very encouraged by this especially because previous medicines were not helpful for mood. Pt has been more active and spending time outside as the weather has improved.   She used to dance and remembers that this was something she loved doing - \"the best antidepressant.\" She now feels more motivated to get reconnected to her [employer].     Suicidal ideation:  No   PHQ9 score is 3 indicating minimal depression.  GAD7 score is is 7 indicating mild anxiety.     Medication side effects: Pt denies problems with SEs. \"Which is fabulous.\"     Medical: No new medical concerns    MEDICATIONS                                                                                              Current medications reviewed today and are noted below.   Current psychotropic medications:   Desvenlafaxine 50mg     Past psychotropic medications:  Escitalopram  Sertraline  Alprazolam  Lamotrigine - helped mood but SEs- disoriented  Bupropion - doesn't recall, thinks last took 20 years ago  Venlafaxine   Celexa  Trazodone     Supplements:   See below       Not reviewed    Current Outpatient Medications   Medication Sig     benzoyl peroxide 5 % topical gel Apply topically to face once daily  Indications: Common Acne     desvenlafaxine (PRISTIQ) 50 MG 24 hr tablet Take 1 tablet (50 mg) by mouth daily     levonorgestrel (MIRENA) 20 MCG/DAY IUD 1 each by Intrauterine route     tretinoin (RETIN-A) 0.025 % external cream Apply a thin layer to face 2 nights each week and slowly increase to nightly use as tolerated     No current facility-administered medications for this visit.        VITALS   There were no vitals taken for this visit.    Pulse Readings from Last 5 Encounters:   03/07/23 64   02/17/23 80   02/07/23 69   01/05/23 60     Wt Readings " "from Last 5 Encounters:   02/17/23 63.5 kg (140 lb)   02/07/23 68 kg (150 lb)   01/05/23 68.1 kg (150 lb 1.3 oz)     BP Readings from Last 5 Encounters:   03/07/23 118/78   02/17/23 110/62   02/07/23 109/72   01/05/23 114/65       LABS & IMAGING                                                                                                                Recent available labs reviewed today.    Recent Labs   Lab Test 04/13/23  1450   CR 0.80   GFRESTIMATED >90     Recent Labs   Lab Test 04/13/23  1450   AST 15   ALT 7*   ALKPHOS 40     Recent Labs   Lab Test 04/13/23  1450   TSH 0.50       ALLERGY & IMMUNIZATIONS       Allergies   Allergen Reactions     Acetaminophen      vomiting      Oxycodone Nausea and Vomiting     vomiting   vomiting        Sulfa Antibiotics Hives and Rash     hives, PN: LW Reaction: HIVES       Oxycodone-Acetaminophen GI Disturbance and Nausea and Vomiting     Other reaction(s): Nausea and/or Vomiting         MEDICAL & SURGICAL HISTORY    Reviewed past medical and surgical history today.   Pregnant - IUD, denies.     No past medical history on file.    MENTAL STATUS EXAM:     Alertness: alert  and oriented  Appearance: adequately groomed  Behavior/Demeanor: cooperative, pleasant and calm, with good eye contact   Speech: normal and regular rate and rhythm  Language: intact and no problems  Psychomotor: normal or unremarkable  Mood: \"good\"  Affect: full range and appropriate; was congruent to mood; was congruent to content  Thought Process/Associations: unremarkable  Thought Content:  Reports none;  Denies suicidal ideation, violent ideation and delusions  Perception:  Reports none;  Denies auditory hallucinations and visual hallucinations  Insight: intact  Judgment: intact  Cognition: does  appear grossly intact; formal cognitive testing was not done  Gait and Station: PILAR     RISK AND PROTECTIVE FACTORS     Static Risk Factors: history of MH diagnoses and/or treatment  Firearms/Weapons Access: " No: Patient denies  Dynamic Risk Factors: anxiety, no perceived reasons for living, mental health stigma and feelings of shame     Protective Factors: hope for the future, compliance with medication, medical compliance, insight, problem solving ability, future oriented, healthy intimate relationships, restricted access to means, resilience, perceived internal locus of control, social support, access to care as needed, motivation and readiness for change, reasons for living, effective coping strategies and displaying help seeking behavior     SAFETY ASSESSMENT      Based on review of above risk and protective factors and today's exam, pt is at low elevated risk of harm to self or others. She does not meet criteria for a 72 hr hold and remains appropriate for ongoing outpatient care. The patient convincingly denies suicidality today. There was no deceit detected, and the patient presented in a manner that was believable. Local community safety resources printed and reviewed for patient to use if needed.     Recommended that patient call 911 or go to the local ED should there be a change in any of these risk factors.     DSM 5 DIAGNOSIS      296.32 (F33.1) Major Depressive Disorder, Recurrent Episode, Moderate _  300.02 (F41.1) Generalized Anxiety Disorder     DIFFERENTIAL DIAGNOSIS: r/o mood disorder/bipolar depression, PMDD by history     Medical comorbidities impacting or contributing to clinical picture: None noted  Known issue that I take into account for their medical decisions, no current exacerbations or new concerns.     ASSESSMENT AND PLAN      ASSESSMENT:  Lupe Sifuentes is a 42 year old White, female who presents for return visit with Collaborative Care Psychiatry Service (CCPS) for medication management.   10 Apr 23: Pt with hx of chronic depression and anxiety with r/o PMDD and a family hx of bipolar disorder presents for evaluation and treatment of her depression, which has been worse the past 6-7  months. She describes irritability, low mood, low energy, guilt, hopelessness. She started Pristiq three days ago with PCP. We discussed giving Pristiq time before evaluating for effectiveness. We discussed her family hx of BAD; it is possible this is more of a bipolar depression presentation. Consider mood stabilizer if needed. Will also review PMDD sx in further detail next appt due to her hx of reported depressive sx occurring prior to cycle and resolving shortly after starting period. Pt denies SI today and will rtc in 3-4 weeks.   Today 9 May 23: Pt with hx of chronic depression and anxiety with r/o PMDD and a family hx of bipolar disorder who returns to clinic reporting improving mood and anxiety symptoms since starting desvenlafaxine. She has not had problematic se and feels more motivated to engage in activities that she knows help her mood - things like running and dancing. We discussed re-engaging in her Swing class by the end of May as a reasonable activating goal. Recommended RTC in 8 weeks or sooner PRN without making changes today. Pt agrees to plan. She denies safety concerns.     TREATMENT PLAN: Medication side effects and alternatives reviewed. Health promotion activities recommended and reviewed. All questions addressed. Education and counseling completed regarding risks and benefits of medications and psychotherapy options. Collaborative Care Psychiatry Service model reviewed today. Recommend therapy for additional support. Safety plan reviewed as indicated.     MEDICATIONS:   -continue PRISTIQ 50mg daily    LABS/RADS:   -Last Labs Obtained: 4/13/23, reviewed    PATIENT STATUS:  CCPS MD/DO/NP/PA providers offer care a specialty service for Primary Care Providers in the [facility] that seek to optimize psychotropic medications for unstable patients.  Once medications have been optimized, our providers discharge the patient back to the referring Primary Care Provider for ongoing medication  management.  This type of system allows our providers to serve a high volume of patients.   -[facility]    PSYCHOSOCIAL:   -continue therapy  -Follow up with primary care provider as planned or for acute medical concerns.    PSYCHOEDUCATION:  Medication side effects and alternatives reviewed. Health promotion activities recommended and reviewed today. All questions addressed. Education and counseling completed regarding risks and benefits of medications and psychotherapy options.  Consent provided by patient/guardian  Call the psychiatric nurse line with medication questions or concerns at [phone].  [employer] may be used to communicate with your provider, but this is not intended to be used for emergencies.  BLACK BOX WARNING: Discussed the Food and Drug Administration (FDA) requires that all antidepressants carry a warning that some children, adolescents and young adults may be at increased risk of suicide when taking antidepressants. Anyone taking an antidepressant should be watched closely for worsening depression or unusual behavior especially in the first few weeks after starting an SSRI. Keep in mind, antidepressants are more likely to reduce suicide risk in the long run by improving mood.   Medlineplus.gov is information for patients.  It is run by the [employer] and it contains information about all disorders, diseases and all medications.      FOLLOW-UP: Follow up in 8 weeks    1. Continue all other treatments (including medications) per primary care provider and/or specialists.   2. To schedule individual or family therapy, call [facility] at [phone].   3. Follow up with primary care provider as planned or for acute medical concerns.  4. Call the psychiatric nurse line with medication questions or concerns at [phone] or [phone].  5. [employer] may be used to communicate with your care team, but this is  not intended to be used for emergencies.    CRISIS RESOURCES:    1. Present to the Emergency Department as needed or call after hours crisis line at [phone] or [phone].   2. [place] Crisis Text Line: Text [place] to 696915.  3. Suicide LifeLine Chat: suicidepreTrueffect.org/chat/.  4. National Suicide Prevention Lifeline: 220.430.9548 (TTY: 619.797.3450). Call anytime for help.  (www.suicidepreventionlifeline.org)  5. National [place] on Mental Illness (www.ryan.org): 876-125-5948 or 873-788-5770.  6. Mental Health Association (www.mentalhealth.org): 666.652.9230 or 325-746-7491.    ADMINISTRATIVE BILLING:    Time spent interviewing patient, reviewing referral documents, obtaining and reviewing outside records, communication with other health specialists, and preparing this report on today's date    Signed:   Jaycee Hernandez DNP, [id number]-BC  Collaborative Care Psychiatry Service (CCPS)

## 2023-05-09 NOTE — PROGRESS NOTES
"  [facility] PSYCHIATRY SERVICE FOLLOW-UP     Name:  Lupe Sifuentes  : 1980     Patient is a 42 year old year old White, female  who presents for follow-up medication management with [facility].  Patient was initially referred by their PCP. Patient attended the session alone.     Patient care team: Patient Care Team:  Babita Eli MD as PCP - General (Family Medicine)  Monika Rolle PA-C as Physician Assistant  Marci Hernandez NP as Assigned Neuroscience Provider    INTERIM HISTORY   Pt was last seen in [facility] for intake on 10 Apr 23. At that time, the plan included ***.    Interim pt communication:  ***    Available records were reviewed prior to visit.    HISTORY OF PRESENT ILLNESS     Per [facility] Myla Cole during today's team-based visit: \"***\"    ---Psychiatry Update---  ***    Suicidal ideation:  {YES / NO:487897::\"No\"}   PHQ9 score is {RPPHQ9:810028}  GAD7 score is {RPGAD7:664913}    Medication side effects: ***    Sleep: ***  Appetite: ***    Medical: ***    MEDICATIONS                                                                                              Current medications reviewed today and are noted below.   Current psychotropic medications:   Desvenlafaxine 50mg     Past psychotropic medications:  Escitalopram  Sertraline  Alprazolam  Lamotrigine - helped mood but SEs- disoriented  Bupropion - doesn't recall, thinks last took 20 years ago  Venlafaxine   Celexa  Trazodone     Supplements:   See below       Not reviewed    Current Outpatient Medications   Medication Sig     benzoyl peroxide 5 % topical gel Apply topically to face once daily  Indications: Common Acne     desvenlafaxine (PRISTIQ) 50 MG 24 hr tablet Take 1 tablet (50 mg) by mouth daily     levonorgestrel (MIRENA) 20 MCG/DAY IUD 1 each by Intrauterine route     tretinoin (RETIN-A) 0.025 % external cream Apply a thin layer to face 2 nights each week and slowly increase to nightly use as tolerated     No " current facility-administered medications for this visit.        VITALS   There were no vitals taken for this visit.    Pulse Readings from Last 5 Encounters:   03/07/23 64   02/17/23 80   02/07/23 69   01/05/23 60     Wt Readings from Last 5 Encounters:   02/17/23 63.5 kg (140 lb)   02/07/23 68 kg (150 lb)   01/05/23 68.1 kg (150 lb 1.3 oz)     BP Readings from Last 5 Encounters:   03/07/23 118/78   02/17/23 110/62   02/07/23 109/72   01/05/23 114/65       LABS & IMAGING                                                                                                                Recent available labs reviewed today.    Recent Labs   Lab Test 04/13/23  1450   CR 0.80   GFRESTIMATED >90     Recent Labs   Lab Test 04/13/23  1450   AST 15   ALT 7*   ALKPHOS 40     Recent Labs   Lab Test 04/13/23  1450   TSH 0.50       ALLERGY & IMMUNIZATIONS       Allergies   Allergen Reactions     Acetaminophen      vomiting      Oxycodone Nausea and Vomiting     vomiting   vomiting        Sulfa Antibiotics Hives and Rash     hives, PN: LW Reaction: HIVES       Oxycodone-Acetaminophen GI Disturbance and Nausea and Vomiting     Other reaction(s): Nausea and/or Vomiting         MEDICAL & SURGICAL HISTORY    Reviewed past medical and surgical history today.   Pregnant - ***.     No past medical history on file.    MENTAL STATUS EXAM:     Alertness: alert  and oriented  Appearance: adequately groomed  Behavior/Demeanor: cooperative, pleasant and calm, with good eye contact   Speech: normal and regular rate and rhythm  Language: intact and no problems  Psychomotor: normal or unremarkable  Mood: depressed  Affect: full range and appropriate; was congruent to mood; was congruent to content  Thought Process/Associations: unremarkable  Thought Content:  Reports none;  Denies suicidal ideation, violent ideation and delusions  Perception:  Reports none;  Denies auditory hallucinations and visual hallucinations  Insight: intact  Judgment:  intact  Cognition: does  appear grossly intact; formal cognitive testing was not done  Gait and Station: PILAR     RISK AND PROTECTIVE FACTORS     Static Risk Factors: history of MH diagnoses and/or treatment  Firearms/Weapons Access: No: Patient denies  Dynamic Risk Factors: anxiety, no perceived reasons for living, mental health stigma and feelings of shame     Protective Factors: hope for the future, compliance with medication, medical compliance, insight, problem solving ability, future oriented, healthy intimate relationships, restricted access to means, resilience, perceived internal locus of control, social support, access to care as needed, motivation and readiness for change, reasons for living, effective coping strategies and displaying help seeking behavior     SAFETY ASSESSMENT      Based on review of above risk and protective factors and today's exam, pt is at low elevated risk of harm to self or others. She does not meet criteria for a 72 hr hold and remains appropriate for ongoing outpatient care. The patient convincingly denies suicidality today. There was no deceit detected, and the patient presented in a manner that was believable. Local community safety resources printed and reviewed for patient to use if needed.     Recommended that patient call 911 or go to the local ED should there be a change in any of these risk factors.     DSM 5 DIAGNOSIS      296.32 (F33.1) Major Depressive Disorder, Recurrent Episode, Moderate _  300.02 (F41.1) Generalized Anxiety Disorder     DIFFERENTIAL DIAGNOSIS: r/o mood disorder/bipolar depression, PMDD by history     Medical comorbidities impacting or contributing to clinical picture: None noted  Known issue that I take into account for their medical decisions, no current exacerbations or new concerns.     ASSESSMENT AND PLAN      ASSESSMENT:  Lupe Sifuentes is a 42 year old White, female who presents for return visit with [facility]  "([facility]) for medication management.   10 Apr 23: Pt with hx of chronic depression and anxiety with r/o PMDD and a family hx of bipolar disorder presents for evaluation and treatment of her depression, which has been worse the past 6-7 months. She describes irritability, low mood, low energy, guilt, hopelessness. She started Pristiq three days ago with PCP. We discussed giving Pristiq time before evaluating for effectiveness. We discussed her family hx of BAD; it is possible this is more of a bipolar depression presentation. Consider mood stabilizer if needed. Will also review PMDD sx in further detail next appt due to her hx of reported depressive sx occurring prior to cycle and resolving shortly after starting period. Pt denies SI today and will rtc in 3-4 weeks.   Today 9 May 23: ***    TREATMENT PLAN: Medication side effects and alternatives reviewed. Health promotion activities recommended and reviewed. All questions addressed. Education and counseling completed regarding risks and benefits of medications and psychotherapy options. [facility] model reviewed today. Recommend therapy for additional support. Safety plan reviewed as indicated.     MEDICATIONS:   -*** PRISTIQ 50mg daily    LABS/RADS:   -Last Labs Obtained: 4/13/23, reviewed    PATIENT STATUS:  [facility] offer care a specialty service for Primary Care Providers in the [facility] that seek to optimize psychotropic medications for unstable patients.  Once medications have been optimized, our providers discharge the patient back to the referring Primary Care Provider for ongoing medication management.  This type of system allows our providers to serve a high volume of patients.   -{mkcareplan:022770::\"Pt will be seen for continued medication stabilization in [facility].\"}    PSYCHOSOCIAL:   -***  -Follow up with primary care provider as planned or for acute medical " "concerns.    PSYCHOEDUCATION:  {RPEDUCATIONVISIT:239433::\"Medication side effects and alternatives reviewed. Health promotion activities recommended and reviewed today. All questions addressed. Education and counseling completed regarding risks and benefits of medications and psychotherapy options.  Consent provided by patient/guardian\",\"Call the psychiatric nurse line with medication questions or concerns at [phone].\",\"MyChart may be used to communicate with your provider, but this is not intended to be used for emergencies.\",\"Medlineplus.gov is information for patients.  It is run by the [employer] and it contains information about all disorders, diseases and all medications.  \"}    FOLLOW-UP: Follow up in *** weeks    1. Continue all other treatments (including medications) per primary care provider and/or specialists.   2. To schedule individual or family therapy, call [facility] at [phone].   3. Follow up with primary care provider as planned or for acute medical concerns.  4. Call the psychiatric nurse line with medication questions or concerns at [phone] or [phone].  5. [employer] may be used to communicate with your care team, but this is not intended to be used for emergencies.    CRISIS RESOURCES:    1. Present to the Emergency Department as needed or call after hours crisis line at [phone] or [phone].   2. [place] Crisis Text Line: Text [place] to 418419.  3. Suicide LifeLine Chat: suicidepreventionlifeline.org/chat/.  4. National Suicide Prevention Lifeline: 763.616.2092 (TTY: 375.102.4521). Call anytime for help.  (www.suicidepreventionlifeline.org)  5. National [place] on Mental Illness (www.ryan.org): 956.496.1101 or 316-589-2737.  6. Mental Health Association (www.mentalhealth.org): 873.356.7353 or 391-683-9539.    ADMINISTRATIVE BILLING:    Time spent interviewing patient, reviewing referral documents, obtaining and reviewing outside " records, communication with other health specialists, and preparing this report on today's date    Signed:   Jaycee Hernandez DNP, PMHNP-BC  [facility] ([facility])

## 2023-05-09 NOTE — PROGRESS NOTES
[facility]  May 9, 2023      Behavioral Health Clinician Progress Note    Patient Name: Lupe Sifuentes           Service Type:  Individual      Service Location:   [facility] / Email (patient reached)     Session Start Time: 11:03 am  Session End Time: 11:30 am      Session Length: 16 - 37      Attendees: Patient     Service Modality:  Video Visit:      Provider verified identity through the following two step process.  Patient provided:  Patient is known previously to provider and Patient was verified at admission/transfer    Telemedicine Visit: The patient's condition can be safely assessed and treated via synchronous audio and visual telemedicine encounter.      Reason for Telemedicine Visit: Services only offered telehealth    Originating Site (Patient Location): Patient's home    Distant Site (Provider Location): Provider Remote Setting- Home Office    Consent:  The patient/guardian has verbally consented to: the potential risks and benefits of telemedicine (video visit) versus in person care; bill my insurance or make self-payment for services provided; and responsibility for payment of non-covered services.     Patient would like the video invitation sent by:  My Chart    Mode of Communication:  Video Conference via [facility]    Distant Location (Provider):  Off-site    As the provider I attest to compliance with applicable laws and regulations related to telemedicine.    Visit Activities (Refresh list every visit): [facility] Only    Diagnostic Assessment Date: 04/10/2023  Treatment Plan Review Date: Due by 08/09/2023  See Flowsheets for today's PHQ-9 and CHANDNI-7 results  Previous PHQ-9:     2/17/2023     2:22 PM 3/7/2023     1:41 PM 4/9/2023    11:19 PM   PHQ-9 SCORE   PHQ-9 Total Score MyChart   8 (Mild depression)   PHQ-9 Total Score 8 9 8    8     Previous CHANDNI-7:       2/17/2023     2:22 PM 3/7/2023     1:41 PM 4/9/2023    11:19 PM   CHANDNI-7 SCORE   Total Score   " 13 (moderate anxiety)   Total Score 8 9 13    13       OLMAN LEVEL:       View : No data to display.                DATA  Extended Session (60+ minutes): No  Interactive Complexity: No  Crisis: No  BH Patient: No    Treatment Objective(s) Addressed in This Session:  Target Behavior(s): depression, anxiety    Depressed Mood: Increase interest, engagement, and pleasure in doing things  Decrease frequency and intensity of feeling down, depressed, hopeless  Anxiety: will experience a reduction in anxiety    Current Stressors / Issues:   Update: \"pretty good.\" Medication does seem to be \"kicking in.\" Mood is starting to level out, less of the lows and really depressed feelings. Not noticing side effects. Improved weather and getting outside more is helping.   Hopeful that couples counseling will help. Still overwhelming with life situations but related to life factors. Able to be more calm and less hopeless.  [facility] praised patient for her progress and improvement.  [facility] processed the realities of mental health and reasonable expectations when it comes to medication.  [facility] processed the importance of patient focusing on changes that she can make for herself and not trying to own the responsibilities of others.  [facility] provided supportive listening as patient talked about the struggles within her marriage and parenting, and wanting to work on healthy changes to improve the family system.  [facility] provided encouragement the patient is on the right track and to allow time for things to improve.  [facility] discussed patient taking on a more passive role in her family and not always being quick to solve things for others.    Stressors: ongoing family/marital stressors.  lost job.    Tx: Dameon Leon at [employer]. Seeing every two weeks for the past year. Helpful for individual needs. Started couples counseling with Jarret Palomo ([facility] [employer]) a month ago and had second appointment so getting going on the work. Needs help with  " owning his own stuff.    Etoh: rarely, 1-2 drinks/week  Substance: denies  Nicotine: denies, non smoker  Caffeine: decreased to 2 cups of tea/coffee per day    Most Important: check in on medications. Feeling stable for now. Follow-up in about a month?    Treatment plan created    Progress on Treatment Objective(s) / Homework:  New Objective established this session - ACTION (Actively working towards change); Intervened by reinforcing change plan / affirming steps taken    Motivational Interviewing    MI Intervention: Co-Developed Goal: Improving overall mood, Expressed Empathy/Understanding, Permission to raise concern or advise, Open-ended questions, Reflections: simple and complex, Change talk (evoked) and Reframe     Change Talk Expressed by the Patient: Desire to change Ability to change Reasons to change Need to change Committment to change Activation Taking steps    Provider Response to Change Talk: E - Evoked more info from patient about behavior change, A - Affirmed patient's thoughts, decisions, or attempts at behavior change, R - Reflected patient's change talk and S - Summarized patient's change talk statements    Also provided psychoeducation about behavioral health condition, symptoms, and treatment options    Care Plan review completed: Yes    Medication Review:  No changes to current psychiatric medication(s)    Medication Compliance:  Yes    Changes in Health Issues:   None reported    Chemical Use Review:   Substance Use: Chemical use reviewed, no active concerns identified      Tobacco Use: No current tobacco use.      Assessment: Current Emotional / Mental Status (status of significant symptoms):  Risk status (Self / Other harm or suicidal ideation)  Patient denies a history of suicidal ideation, suicide attempts, self-injurious behavior, homicidal ideation, homicidal behavior and and other safety concerns  Patient denies current fears or concerns for personal safety.  Patient denies current or  recent suicidal ideation or behaviors.  Patient denies current or recent homicidal ideation or behaviors.  Patient denies current or recent self injurious behavior or ideation.  Patient denies other safety concerns.  A safety and risk management plan has not been developed at this time, however patient was encouraged to call [place] [phone] should there be a change in any of these risk factors.    Appearance:   Appropriate   Eye Contact:   Good   Psychomotor Behavior: Normal   Attitude:   Cooperative   Orientation:   All  Speech   Rate / Production: Normal    Volume:  Normal   Mood:    Anxious  Depressed   Affect:    Appropriate   Thought Content:  Clear   Thought Form:  Coherent  Logical   Insight:    Good     Diagnoses:  1. Generalized anxiety disorder    2. Moderate episode of recurrent major depressive disorder (H)        Collateral Reports Completed:  Communicated with: [facility] Team    Plan: (Homework, other):  Patient was given information about behavioral services and encouraged to schedule a follow up appointment with the clinic [facility] as needed.  She was also given information about mental health symptoms and treatment options .  CD Recommendations: No indications of CD issues.       ______________________________________________________________________    Integrated Primary Care Behavioral Health Treatment Plan    Patient's Name: Lupe Sifuentes  YOB: 1980    Date of Creation: 05/09/2023  Date Treatment Plan Last Reviewed/Revised: Pending    DSM5 Diagnoses: 296.32 (F33.1) Major Depressive Disorder, Recurrent Episode, Moderate _ or 300.02 (F41.1) Generalized Anxiety Disorder  Psychosocial / Contextual Factors: Patient lives with her  and 2 young children.  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  She does have family and social supports.  PROMIS (reviewed every 90 days): 24    Referral / Collaboration:  Was/were discussed and patient will pursue.  Patient is currently connected with an  individual and couple's therapists.    Anticipated number of session for this episode of care: 3-5 sessions  Anticipation frequency of session: Monthly  Anticipated Duration of each session: 16-37 minutes  Treatment plan will be reviewed in 90 days or when goals have been changed.       MeasurableTreatment Goal(s) related to diagnosis / functional impairment(s)  Goal 1: Patient will report improvement in her overall mood.    I will know I've met my goal when less overwhelmed and stressed out.      Objective #A (Patient Action)    Patient will Increase interest, engagement, and pleasure in doing things  Decrease frequency and intensity of feeling down, depressed, hopeless.  Status: New - Date: 05/09/2023     Intervention(s)  Therapist will teach emotional regulation skills. CBT for depression.    Objective #B  Patient will use cognitive strategies identified in therapy to challenge anxious thoughts.  Status: New - Date: 05/09/2023     Intervention(s)  Therapist will teach emotional regulation skills. CBT for anxiety.    Patient has reviewed and agreed to the above plan.      BALDO Valdivia  May 9, 2023  Answers for HPI/ROS submitted by the patient on 5/9/2023  If you checked off any problems, how difficult have these problems made it for you to do your work, take care of things at home, or get along with other people?: Somewhat difficult  PHQ9 TOTAL SCORE: 3  CHANDNI 7 TOTAL SCORE: 7

## 2023-05-09 NOTE — PATIENT INSTRUCTIONS
Quang Brand,    Thank you for our time together today in Collaborative Care Psychiatry Service (CCPS). CCPS provides brief psychiatric medication stabilization to patients referred by their Primary Care Providers. Patients are typically seen in CCPS for up to 4 appointments and then referred back to the PCP for ongoing refills unless longer term medication management by a specialist is indicated. If I believe you will benefit from long-term psychiatric care I will discuss this with you. If you are interested in seeing a psychiatrist or psychiatric nurse practitioner long-term, please send me a message in [place] so we can refer you appropriately.     Treatment Plan:  Continue Pristiq 50mg daily  Start dance by the end of May  Follow up after [place] trip/8 weeks out  You can call [phone] to make appointments, leave a message with our nursing team, and inquire about any mental health referrals I have placed.  Please call your pharmacy to request a refill of your medicines listed above if needed between appointments.     ZUHAIR Rao  Collaborative Care Psychiatry Service  [facility]   Patient Education   Collaborative Care Psychiatry Service  What to Expect  Here's what to expect from your Collaborative Care Psychiatry Service (CCPS).   About CCPS  CCPS means 2 people work together to help you get better. You'll meet with a behavioral health clinician and a psychiatric doctor. A behavioral health clinician helps people with mental health problems by talking with them. A psychiatric doctor helps people by giving them medicine.  How it works  At every visit, you'll see the behavioral health clinician (BHC) first. They'll talk with you about how you're doing and teach you how to feel better.   Then you'll see the psychiatric doctor. This doctor can help you deal with troubling thoughts and feelings by giving you medicine. They'll make sure you know the plan for your care.   CCPS usually takes 3 to 6  "visits. If you need more visits, we may have you start seeing a different psychiatric doctor for ongoing care.  If you have any questions or concerns, we'll be glad to talk with you.  About visits  Be open  At your visits, please talk openly about your problems. It may feel hard, but it's the best way for us to help you.  Cancelling visits  If you can't come to your visit, please call us right away at [phone]. If you don't cancel at least 24 hours (1 full day) before your visit, that's \"late cancellation.\"  Being late to visits  Being very late is the same as not showing up. You will be a \"no show\" if:  Your appointment starts with a [facility], and you're more than 15 minutes late for a 30-minute (half hour) visit. This will also cancel your appointment with the psychiatric doctor.  Your appointment is with a psychiatric doctor only, and you're more than 15 minutes late for a 30-minute (half hour) visit.  Your appointment is with a psychiatric doctor only, and you're more than 30 minutes late for a 60-minute (full hour) visit.  If you cancel late or don't show up 2 times within 6 months, we may end your care.   Getting help between visits  If you need help between visits, you can call us Monday to Friday from 8 a.m. to 4:30 p.m. at [phone].  Emergency care  Call 911 or go to the nearest emergency department if your life or someone else's life is in danger.  Call 148 anytime to reach the national Suicide and Crisis hotline.  Medicine refills  To refill your medicine, call your pharmacy. You can also call [facility]'s Behavioral Access at [phone], Monday to Friday, 8 a.m. to 4:30 p.m. It can take 1 to 3 business days to get a refill.   Forms, letters, and tests  You may have papers to fill out, like FMLA, short-term disability, and workability. We can help you with these forms at your visits, but you must have an appointment. You may need more than 1 visit for this, to be in an intensive therapy " program, or both.  Before we can give you medicine for ADHD, we may refer you to get tested for it or confirm it another way.  We may not be able to give you an emotional support animal letter.  We don't do mental health checks ordered by the court.   We don't do mental health testing, but we can refer you to get tested.   Thank you for choosing us for your care.  For informational purposes only. Not to replace the advice of your health care provider. Copyright   2022 [facility]. All rights reserved. [employer] 205060 - 12/22.

## 2023-05-09 NOTE — NURSING NOTE
Is the patient currently in the state of [place]? YES    Visit mode:VIDEO    If the visit is dropped, the patient can be reconnected by: VIDEO VISIT: Send to e-mail at: [email or web address]    Will anyone else be joining the visit? NO      How would you like to obtain your AVS? MyChart    Are changes needed to the allergy or medication list? NO    Reason for visit: Video Visit      Steph GREEN

## 2023-05-17 ENCOUNTER — TELEPHONE (OUTPATIENT)
Dept: UROLOGY | Facility: CLINIC | Age: 43
End: 2023-05-17

## 2023-05-17 ENCOUNTER — OFFICE VISIT (OUTPATIENT)
Dept: UROLOGY | Facility: CLINIC | Age: 43
End: 2023-05-17
Payer: COMMERCIAL

## 2023-05-17 VITALS
DIASTOLIC BLOOD PRESSURE: 64 MMHG | SYSTOLIC BLOOD PRESSURE: 110 MMHG | WEIGHT: 140 LBS | HEIGHT: 64 IN | BODY MASS INDEX: 23.9 KG/M2

## 2023-05-17 DIAGNOSIS — N39.3 FEMALE STRESS INCONTINENCE: ICD-10-CM

## 2023-05-17 DIAGNOSIS — R35.0 URINARY FREQUENCY: ICD-10-CM

## 2023-05-17 DIAGNOSIS — R39.15 URINARY URGENCY: Primary | ICD-10-CM

## 2023-05-17 PROCEDURE — 99214 OFFICE O/P EST MOD 30 MIN: CPT | Performed by: PHYSICIAN ASSISTANT

## 2023-05-17 RX ORDER — MIRABEGRON 25 MG/1
25 TABLET, FILM COATED, EXTENDED RELEASE ORAL DAILY
Qty: 90 TABLET | Refills: 3 | Status: SHIPPED | OUTPATIENT
Start: 2023-05-17 | End: 2024-03-25

## 2023-05-17 ASSESSMENT — PAIN SCALES - GENERAL: PAINLEVEL: NO PAIN (0)

## 2023-05-17 NOTE — LETTER
5/17/2023       RE: Lupe Sifuentes  [address]  [facility] [address]     Dear Colleague,    Thank you for referring your patient, Lupe Sifuentes, to the [facility]. Please see a copy of my visit note below.    Urology Clinic      Name: Lupe Sifuentes    MRN: [id number]   YOB: 1980  Accompanied at today's visit by:self               Chief Complaint:   Urgency/frequency          History of Present Illness:   May 17, 2023    HISTORY:   We have been following 42 year old Lupe Sifuentes for urinary urgency/frequency, KIANA. Has failed oxybutynin due to dry mouth. Last seen on 2/17/23. Was not interested in PFPT. Started on myrbetriq 25mg daily and here for follow-up. Unfortunately the medication was too expensive so switched to vesicare. She tried vesicare but did not improve symptoms and caused severe dry mouth. Has been very busy lately and does not know if she would have the time for PFPT at this time. Planning to go to [place] with her family this summer. Planning to return in July. Patient voices no other concerns at this time.            Allergies:     Allergies   Allergen Reactions    Acetaminophen      vomiting     Oxycodone Nausea and Vomiting     vomiting   vomiting       Sulfa Antibiotics Hives and Rash     hives, PN: LW Reaction: HIVES      Oxycodone-Acetaminophen GI Disturbance and Nausea and Vomiting     Other reaction(s): Nausea and/or Vomiting              Medications:     Current Outpatient Medications   Medication Sig    benzoyl peroxide 5 % topical gel Apply topically to face once daily  Indications: Common Acne    desvenlafaxine (PRISTIQ) 50 MG 24 hr tablet Take 1 tablet (50 mg) by mouth daily    levonorgestrel (MIRENA) 20 MCG/DAY IUD 1 each by Intrauterine route    mirabegron (MYRBETRIQ) 25 MG 24 hr tablet Take 1 tablet (25 mg) by mouth daily    tretinoin (RETIN-A) 0.025 % external  "cream Apply a thin layer to face 2 nights each week and slowly increase to nightly use as tolerated     No current facility-administered medications for this visit.               Past  Surgical History:   History reviewed. No pertinent surgical history.          Physical Exam:     Vitals:    05/17/23 1412   BP: 110/64   Weight: 63.5 kg (140 lb)   Height: 1.626 m (5' 4.02\")     PSYCH: NAD  EYES: EOMI  NEURO: AAO x3    LABS:     Creatinine   Date Value Ref Range Status   04/13/2023 0.80 0.51 - 0.95 mg/dL Final   04/06/2009 0.79 0.52 - 1.04 mg/dL Final     Comment:     New IDMS-traceable calibration  beginning 5/1/08            Assessment and Plan:   42 year old is a pleasant female who has  urinary urgency/frequency, KIANA    Plan:  - Start Myrbetriq 25mg daily; will try to get prior auth since she has had side effects to antimuscarinics. Discussed risks/benifits and potential side effects. If too expensive, switch to Myrbetriq. If can't get PA for myrbetriq, consider oxybutyhnin in the mean time while on her trip; patient is aware her severe dry mouth will return.   - Not interested in PFPT at this time.   - consider third line options if fails conservative therapies.  - Consider UDS and cysto if fails conservative therapies.   - follow-up in 3 months after her trip with PVR and BP check.   - After discussing the assessment and plan with patient, patient verbalizes understanding and agrees to the above plan. All questions answered.     28 minutes spent on the date of the encounter doing chart review, review of outside records, review of test results, interpretation of tests, patient visit and documentation.      Monika Rolle PA-C  Urology  May 17, 2023      Patient Care Team:  Babita Eli MD as PCP - General (Family Medicine)  Monika Rolle PA-C as Physician Assistant  Marci Hernandez NP as Assigned Neuroscience Provider       "

## 2023-05-17 NOTE — PROGRESS NOTES
Urology Clinic      Name: Lupe Sifuentes    MRN: [id number]   YOB: 1980  Accompanied at today's visit by:self               Chief Complaint:   Urgency/frequency          History of Present Illness:   May 17, 2023    HISTORY:   We have been following 42 year old Lupe Sifuentes for urinary urgency/frequency, KIANA. Has failed oxybutynin due to dry mouth. Last seen on 2/17/23. Was not interested in PFPT. Started on myrbetriq 25mg daily and here for follow-up. Unfortunately the medication was too expensive so switched to vesicare. She tried vesicare but did not improve symptoms and caused severe dry mouth. Has been very busy lately and does not know if she would have the time for PFPT at this time. Planning to go to [place] with her family this summer. Planning to return in July. Patient voices no other concerns at this time.            Allergies:     Allergies   Allergen Reactions     Acetaminophen      vomiting      Oxycodone Nausea and Vomiting     vomiting   vomiting        Sulfa Antibiotics Hives and Rash     hives, PN: LW Reaction: HIVES       Oxycodone-Acetaminophen GI Disturbance and Nausea and Vomiting     Other reaction(s): Nausea and/or Vomiting              Medications:     Current Outpatient Medications   Medication Sig     benzoyl peroxide 5 % topical gel Apply topically to face once daily  Indications: Common Acne     desvenlafaxine (PRISTIQ) 50 MG 24 hr tablet Take 1 tablet (50 mg) by mouth daily     levonorgestrel (MIRENA) 20 MCG/DAY IUD 1 each by Intrauterine route     mirabegron (MYRBETRIQ) 25 MG 24 hr tablet Take 1 tablet (25 mg) by mouth daily     tretinoin (RETIN-A) 0.025 % external cream Apply a thin layer to face 2 nights each week and slowly increase to nightly use as tolerated     No current facility-administered medications for this visit.               Past  Surgical History:   History reviewed. No pertinent surgical history.          Physical Exam:     Vitals:     "05/17/23 1412   BP: 110/64   Weight: 63.5 kg (140 lb)   Height: 1.626 m (5' 4.02\")     PSYCH: NAD  EYES: EOMI  NEURO: AAO x3    LABS:     Creatinine   Date Value Ref Range Status   04/13/2023 0.80 0.51 - 0.95 mg/dL Final   04/06/2009 0.79 0.52 - 1.04 mg/dL Final     Comment:     New IDMS-traceable calibration  beginning 5/1/08            Assessment and Plan:   42 year old is a pleasant female who has  urinary urgency/frequency, KIANA    Plan:  - Start Myrbetriq 25mg daily; will try to get prior auth since she has had side effects to antimuscarinics. Discussed risks/benifits and potential side effects. If too expensive, switch to Myrbetriq. If can't get PA for myrbetriq, consider oxybutyhnin in the mean time while on her trip; patient is aware her severe dry mouth will return.   - Not interested in PFPT at this time.   - consider third line options if fails conservative therapies.  - Consider UDS and cysto if fails conservative therapies.   - follow-up in 3 months after her trip with PVR and BP check.   - After discussing the assessment and plan with patient, patient verbalizes understanding and agrees to the above plan. All questions answered.     28 minutes spent on the date of the encounter doing chart review, review of outside records, review of test results, interpretation of tests, patient visit and documentation.      Monika Rolle PA-C  Urology  May 17, 2023      Patient Care Team:  Babita Eli MD as PCP - General (Family Medicine)  Monika Rolle PA-C as Physician Assistant  Marci Hernandez NP as Assigned Neuroscience Provider       "

## 2023-05-17 NOTE — NURSING NOTE
Chief Complaint   Patient presents with     OAB     Patient here for 6 month recheck.   Trang James LPN

## 2023-05-22 NOTE — TELEPHONE ENCOUNTER
Central Prior Authorization Team   Phone: [phone]      PA Initiation    Medication: MYRBETRIQ 25 MG PO TB24  Insurance Company: [employer] [facility] - Phone [phone] Fax [phone]  Pharmacy Filling the Rx: [facility] #[id number] [address] AT [address] [place] [address]  Filling Pharmacy Phone: [phone]  Filling Pharmacy Fax:    Start Date: 5/22/2023

## 2023-05-23 NOTE — TELEPHONE ENCOUNTER
Prior Authorization Approval    Medication: MYRBETRIQ 25 MG PO TB24  Authorization Effective Date: 2/21/2023  Authorization Expiration Date: 5/22/2024  Approved Dose/Quantity:   Reference #:     Insurance Company: [employer] - Phone [phone] Fax [phone]  Expected CoPay:     $0  CoPay Card Available:      Financial Assistance Needed:   Which Pharmacy is filling the prescription: [facility] #[id number] [address] AT [address] [place] [address]  Pharmacy Notified: Yes  Patient Notified: No-Pharmacy will notify patient when ready.

## 2023-05-24 ENCOUNTER — TELEPHONE (OUTPATIENT)
Dept: UROLOGY | Facility: CLINIC | Age: 43
End: 2023-05-24
Payer: COMMERCIAL

## 2023-05-24 NOTE — TELEPHONE ENCOUNTER
----- Message from Cuca Tomlin sent at 5/23/2023  2:45 PM CDT -----  Follow-up after her trip from [place] with PVR and BP check at follow-up appointment with adi VELEZ  5/17/23

## 2023-07-08 NOTE — PROGRESS NOTES
Virtual Visit Details    Type of service:  Video Visit   Video Start Time: 10:28 AM  Video End Time:11:07 AM    Originating Location (pt. Location): Home    Distant Location (provider location):  Off-site  Platform used for Video Visit: [employer]      ADMINISTRATIVE BILLING:    Time spent interviewing patient, reviewing referral documents, obtaining and reviewing outside records, communication with other health specialists, and preparing this report on today's date  Total time: 50 mins      [facility] PSYCHIATRIC SERVICE FOLLOW UP     Name:  Lupe Sifuentes  : 1980     Telemedicine Visit: The patient's condition can be safely assessed and treated via synchronous audio and visual telemedicine encounter.      Reason for Telemedicine Visit: COVID 19 pandemic and the social and physical recommendations by the CDC and MD.      Originating Site (Patient Location): Patient's home     Distant Site (Provider Location): Provider Remote Setting     Consent:  The patient/guardian has verbally consented to: the potential risks and benefits of telemedicine (video visit or phone) versus in person care; bill my insurance or make self-payment for services provided; and responsibility for payment of non-covered services.     Mode of Communication:  [email or web address] platform      As the provider I attest to compliance with applicable laws and regulations related to telemedicine.    IDENTIFICATION     Patient is a 42 year old year old White, female  who presents for follow-up medication management with [facility].  Patient was initially referred by their PCP. Patient attended the session alone.   The [facility] psychiatry providers act as a specialty service for Primary Care Providers in the [facility] who seek to optimize medications for unstable patients.  Once medications have been optimized, [facility] providers discharge the patient back to the referring Primary Care Provider for ongoing medication management.  This type  "of system allows [facility] to serve a high volume of patients.      Patient care team: Patient Care Team:  Babita Eli MD as PCP - General (Family Medicine)  Monika Rolle PA-C as Physician Assistant  Marci Hernandez NP as Assigned Neuroscience Provider    INTERIM HISTORY   [facility] on f/u. At that time, the plan included   -continue PRISTIQ 50mg daily.    Interim pt communication:  none    Available records were reviewed prior to visit.    HISTORY OF PRESENT ILLNESS     Currently: Pt returned from [place] on Wednesday. They picked up their puppy (8w) on Thursday. She describes feeling hopeless in her marriage sometimes. They are in couples' counseling and this has big ups and downs.     Mood/anxiety: Pt is perceiving pristiq is helpful for mood/anxiety. She has noticed that she doesn't feel depression lasts for days or weeks at a time. She still has periods of feeling low or sad but it doesn't last. \"I still definitely feel depressed sometimes. That also goes along with stuff in my life.\"  Suicidal ideation:  No   PHQ9 score is Not completed today  GAD2 score is 2 indicating subclinical anxiety    Sleep: Pt with reduced sleep due to waking up to help with the puppy.     Medications: No new SE since starting desvenlafaxine.     Medical:  recently tested positive for COVID. No one else is ill.     SUBSTANCE USE HISTORY    Tobacco use: none  Caffeine:  Yes  2+ cups/day  Current alcohol:  Social a few days per week  Current substance use: none  Past use alcohol/substance use: none    MEDICATIONS                                                                                              Current medications reviewed today and are noted below.   Current psychotropic medications:   DESVENLAFAXINE (PRISTIQ) 50mg     Past psychotropic medications:  Escitalopram  Sertraline  Alprazolam  Lamotrigine - helped mood but SEs- disoriented  Bupropion - doesn't recall, thinks last took " 20 years ago  Venlafaxine   Celexa  Trazodone    Supplements:   See below      Not reviewed    Current Outpatient Medications   Medication Sig     benzoyl peroxide 5 % topical gel Apply topically to face once daily  Indications: Common Acne     desvenlafaxine (PRISTIQ) 50 MG 24 hr tablet Take 1 tablet (50 mg) by mouth daily     levonorgestrel (MIRENA) 20 MCG/DAY IUD 1 each by Intrauterine route     mirabegron (MYRBETRIQ) 25 MG 24 hr tablet Take 1 tablet (25 mg) by mouth daily     tretinoin (RETIN-A) 0.025 % external cream Apply a thin layer to face 2 nights each week and slowly increase to nightly use as tolerated     No current facility-administered medications for this visit.        VITALS   There were no vitals taken for this visit.    Pulse Readings from Last 5 Encounters:   03/07/23 64   02/17/23 80   02/07/23 69   01/05/23 60     Wt Readings from Last 5 Encounters:   05/17/23 63.5 kg (140 lb)   02/17/23 63.5 kg (140 lb)   02/07/23 68 kg (150 lb)   01/05/23 68.1 kg (150 lb 1.3 oz)     BP Readings from Last 5 Encounters:   05/17/23 110/64   03/07/23 118/78   02/17/23 110/62   02/07/23 109/72   01/05/23 114/65       LABS & IMAGING                                                                                                                Recent available labs reviewed today.    Recent Labs   Lab Test 04/13/23  1450   CR 0.80   GFRESTIMATED >90     Recent Labs   Lab Test 04/13/23  1450   AST 15   ALT 7*   ALKPHOS 40     Recent Labs   Lab Test 04/13/23  1450   TSH 0.50       ALLERGY & IMMUNIZATIONS       Allergies   Allergen Reactions     Acetaminophen      vomiting      Oxycodone Nausea and Vomiting     vomiting   vomiting        Sulfa Antibiotics Hives and Rash     hives, PN: LW Reaction: HIVES       Oxycodone-Acetaminophen GI Disturbance and Nausea and Vomiting     Other reaction(s): Nausea and/or Vomiting         MEDICAL & SURGICAL HISTORY    Reviewed past medical and surgical history today.   Pregnant -  "denies.     No past medical history on file.    MENTAL STATUS EXAM:     Alertness: alert  and oriented  Appearance: adequately groomed  Behavior/Demeanor: cooperative, pleasant and calm, with good eye contact   Speech: normal and regular rate and rhythm  Language: intact and no problems  Psychomotor: normal or unremarkable  Mood: \"good\"  Affect: full range and appropriate; was congruent to mood; was congruent to content  Thought Process/Associations: unremarkable  Thought Content:  Reports none;  Denies suicidal ideation, violent ideation and delusions  Perception:  Reports none;  Denies auditory hallucinations and visual hallucinations  Insight: intact  Judgment: intact  Cognition: does  appear grossly intact; formal cognitive testing was not done  Gait and Station: PILAR     RISK AND PROTECTIVE FACTORS     Static Risk Factors: history of MH diagnoses and/or treatment  Firearms/Weapons Access: No: Patient denies  Dynamic Risk Factors: anxiety, no perceived reasons for living, mental health stigma and feelings of shame     Protective Factors: hope for the future, compliance with medication, medical compliance, insight, problem solving ability, future oriented, healthy intimate relationships, restricted access to means, resilience, perceived internal locus of control, social support, access to care as needed, motivation and readiness for change, reasons for living, effective coping strategies and displaying help seeking behavior     SAFETY ASSESSMENT      Based on review of above risk and protective factors and today's exam, pt is at low elevated risk of harm to self or others. She does not meet criteria for a 72 hr hold and remains appropriate for ongoing outpatient care. The patient convincingly denies suicidality today. There was no deceit detected, and the patient presented in a manner that was believable. Local community safety resources printed and reviewed for patient to use if needed.     Recommended that " patient call 911 or go to the local ED should there be a change in any of these risk factors.     DSM 5 DIAGNOSIS      296.32 (F33.1) Major Depressive Disorder, Recurrent Episode, Moderate _  300.02 (F41.1) Generalized Anxiety Disorder     DIFFERENTIAL DIAGNOSIS: r/o mood disorder/bipolar depression, PMDD by history     Medical comorbidities impacting or contributing to clinical picture: None noted  Known issue that I take into account for their medical decisions, no current exacerbations or new concerns.     ASSESSMENT AND PLAN       ASSESSMENT:  Lupe Sifuentes is a 42 year old White, female who presents for return visit with [facility] Psychiatry Service ([facility]) for medication management.   9 May 23: Pt with hx of chronic depression and anxiety with r/o PMDD and a family hx of bipolar disorder who returns to clinic reporting improving mood and anxiety symptoms since starting desvenlafaxine. She has not had problematic se and feels more motivated to engage in activities that she knows help her mood - things like running and dancing. We discussed re-engaging in her Swing class by the end of May as a reasonable activating goal. Recommended RTC in 8 weeks or sooner PRN without making changes today. Pt agrees to plan. She denies safety concerns.   Today 11 July 23: Pt with hx of chronic depression and anxiety who returns to clinic with stable mood/anxiety on Desvenlafaxine (Pristiq). She is working on CBT skills in therapy through [facility] and also doing couples' counseling. We discussed incorporating resistance training into her weeks to help with her resilience and general mental health. Recommended returning to PCP for ongoing refills given her stability on Desvenlafaxine (Pristiq) and my upcoming transition out of this department. Pt does feel stable and agrees to return to PCP for refills. No safety concerns identified today.      TREATMENT PLAN: Medication side effects and alternatives reviewed. Health  promotion activities recommended and reviewed. All questions addressed. Education and counseling completed regarding risks and benefits of medications and psychotherapy options. Collaborative Care Psychiatry Service model reviewed today. Recommend therapy for additional support. Safety plan reviewed as indicated.     MEDICATIONS:   -continue PRISTIQ 50mg daily    FUTURE RECOMMENDATIONS:  The prescriber and [facility] who were involved in this patient's psychiatric care have collaborated and agree that the patient is ready to graduate from [facility]. This information was provided to the patient during this last visit.    We have completed consultation with the patient and are returning to you for continued care. If symptoms worsen here are potential next steps:??   ??   Changes using current medication regimen:???   Increase DESVENLAFAXINE (PRISTIQ) to 75mg or 100mg daily  ?If this doesn't work, was there enough time on the medication or a high enough dose??   ?   Changes using alternative medications:???   Consider Duloxetine (Cymbalta) ?   Consider vilazodone or vortioxetine?   ?If these don't work, was there enough time on the medication or a high enough dose??   ?   Behavioral Considerations:?   No Behavioral Consideration at this time.??     If post consult recommendations fail, use any of the following options to connect.?     Reach out through Teams or staff message for guidance??     Send an eConsult ([id number])??     Refer back for another consultation or establish care with Psychiatry (Mental Health Referral)([id number])     LABS/RADS:   -Last Labs Obtained: 4/13/23, reviewed    PATIENT STATUS:  [facility] MD/DO/NP/PA providers offer care a specialty service for Primary Care Providers in the [facility] that seek to optimize psychotropic medications for unstable patients.  Once medications have been optimized, our providers discharge the patient back to the referring Primary Care Provider for ongoing medication management.  This  type of system allows our providers to serve a high volume of patients.   -    PSYCHOSOCIAL:   -continue therapy as scheduled  -Follow up with primary care provider as planned or for acute medical concerns.    PSYCHOEDUCATION:  Medication side effects and alternatives reviewed. Health promotion activities recommended and reviewed today. All questions addressed. Education and counseling completed regarding risks and benefits of medications and psychotherapy options.  Consent provided by patient/guardian  Call the psychiatric nurse line with medication questions or concerns at [phone].  Cinposthart may be used to communicate with your provider, but this is not intended to be used for emergencies.  BLACK BOX WARNING: Discussed the Food and Drug Administration (FDA) requires that all antidepressants carry a warning that some children, adolescents and young adults may be at increased risk of suicide when taking antidepressants. Anyone taking an antidepressant should be watched closely for worsening depression or unusual behavior especially in the first few weeks after starting an SSRI. Keep in mind, antidepressants are more likely to reduce suicide risk in the long run by improving mood.   Medlineplus.gov is information for patients.  It is run by the [employer] and it contains information about all disorders, diseases and all medications.      FOLLOW-UP: Follow up with PCP    1. Continue all other treatments (including medications) per primary care provider and/or specialists.   2. To schedule individual or family therapy, call [facility] at [phone].   3. Follow up with primary care provider as planned or for acute medical concerns.  4. Call the psychiatric nurse line with medication questions or concerns at [phone] or [phone].  5. Cinposthart may be used to communicate with your care team, but this is not intended to be used for emergencies.    CRISIS  RESOURCES:    1. Present to the Emergency Department as needed or call after hours crisis line at [phone] or [phone].   2. [place] Crisis Text Line: Text [place] to 435099.  3. Suicide LifeLine Chat: suicidepreTrilogy International Partnersline.org/chat/.  4. National Suicide Prevention Lifeline: 116.586.2998 (TTY: 724.426.1711). Call anytime for help.  (www.suicidepreventionlifeline.org)  5. National [place] on Mental Illness (www.ryan.org): 372-473-0230 or 840-525-2438.  6. Mental Health Association (www.mentalhealth.org): 394.299.6166 or 280-649-6770.      Signed:   Jaycee Hernandez DNP, [id number]-BC  Collaborative Care Psychiatry Service ([facility]PS)

## 2023-07-11 ENCOUNTER — VIRTUAL VISIT (OUTPATIENT)
Dept: PSYCHIATRY | Facility: CLINIC | Age: 43
End: 2023-07-11
Payer: COMMERCIAL

## 2023-07-11 DIAGNOSIS — F33.41 MAJOR DEPRESSIVE DISORDER, RECURRENT EPISODE, IN PARTIAL REMISSION (H): ICD-10-CM

## 2023-07-11 DIAGNOSIS — F41.1 GAD (GENERALIZED ANXIETY DISORDER): Primary | ICD-10-CM

## 2023-07-11 PROCEDURE — 99215 OFFICE O/P EST HI 40 MIN: CPT | Mod: VID | Performed by: STUDENT IN AN ORGANIZED HEALTH CARE EDUCATION/TRAINING PROGRAM

## 2023-07-11 RX ORDER — DESVENLAFAXINE 50 MG/1
50 TABLET, FILM COATED, EXTENDED RELEASE ORAL DAILY
Qty: 90 TABLET | Refills: 0 | Status: SHIPPED | OUTPATIENT
Start: 2023-07-11 | End: 2023-12-19

## 2023-07-11 NOTE — NURSING NOTE
Is the patient currently in the state of [place]? YES    Visit mode:VIDEO    If the visit is dropped, the patient can be reconnected by: VIDEO VISIT: Text to cell phone: [phone]    Will anyone else be joining the visit? NO      How would you like to obtain your AVS? MyChart    Are changes needed to the allergy or medication list? NO    Reason for visit: RECHECK

## 2023-07-11 NOTE — PATIENT INSTRUCTIONS
Moving from: Collaborative Care Psychiatry Service (CCPS)    Moving to: Primary Care        Congratulations - you're ready to graduate from the CCPS program. Because of your hard work, you've achieved better mental health for 2+ months. Keep it up!        We will tell your family clinic that you've completed our program. This way, they can help you build on the progress you've made in your mental health.         Here's what happens next:    Within the next 3 months: Please set up a visit with your family clinic (primary care provider). Ask for a mental health check-in.     Stay on your current medicines--don't change your doses. Your symptoms could get worse if you quickly stop or decrease your medicine.    We've refilled your mental health medicines for the next 3 months (90 days). Future refills or dose changes should come from your family clinic.    If you're in therapy, keep it up! If you're not but would like to start, ask your family clinic for a referral to therapy. Or call Behavioral Access ([phone]) to set up a visit with a therapist.        It's been a pleasure to work with you! If you and your clinic decide that you should return to [facility] in the future, we remain ready to serve you. Ask your clinic for a new referral if needed.       Patient Education   Collaborative Care Psychiatry Service  What to Expect  Here's what to expect from your Collaborative Care Psychiatry Service (CCPS).   About CCPS  CCPS means 2 people work together to help you get better. You'll meet with a behavioral health clinician and a psychiatric doctor. A behavioral health clinician helps people with mental health problems by talking with them. A psychiatric doctor helps people by giving them medicine.  How it works  At every visit, you'll see the behavioral health clinician (BHC) first. They'll talk with you about how you're doing and teach you how to feel better.   Then you'll see the psychiatric doctor. This doctor can help  "you deal with troubling thoughts and feelings by giving you medicine. They'll make sure you know the plan for your care.   CCPS usually takes 3 to 6 visits. If you need more visits, we may have you start seeing a different psychiatric doctor for ongoing care.  If you have any questions or concerns, we'll be glad to talk with you.  About visits  Be open  At your visits, please talk openly about your problems. It may feel hard, but it's the best way for us to help you.  Cancelling visits  If you can't come to your visit, please call us right away at [phone]. If you don't cancel at least 24 hours (1 full day) before your visit, that's \"late cancellation.\"  Being late to visits  Being very late is the same as not showing up. You will be a \"no show\" if:  Your appointment starts with a [facility], and you're more than 15 minutes late for a 30-minute (half hour) visit. This will also cancel your appointment with the psychiatric doctor.  Your appointment is with a psychiatric doctor only, and you're more than 15 minutes late for a 30-minute (half hour) visit.  Your appointment is with a psychiatric doctor only, and you're more than 30 minutes late for a 60-minute (full hour) visit.  If you cancel late or don't show up 2 times within 6 months, we may end your care.   Getting help between visits  If you need help between visits, you can call us Monday to Friday from 8 a.m. to 4:30 p.m. at [phone].  Emergency care  Call 911 or go to the nearest emergency department if your life or someone else's life is in danger.  Call 988 anytime to reach the national Suicide and Crisis hotline.  Medicine refills  To refill your medicine, call your pharmacy. You can also call [facility]'s Behavioral Access at [phone], Monday to Friday, 8 a.m. to 4:30 p.m. It can take 1 to 3 business days to get a refill.   Forms, letters, and tests  You may have papers to fill out, like FMLA, short-term disability, and workability. We " can help you with these forms at your visits, but you must have an appointment. You may need more than 1 visit for this, to be in an intensive therapy program, or both.  Before we can give you medicine for ADHD, we may refer you to get tested for it or confirm it another way.  We may not be able to give you an emotional support animal letter.  We don't do mental health checks ordered by the court.   We don't do mental health testing, but we can refer you to get tested.   Thank you for choosing us for your care.  For informational purposes only. Not to replace the advice of your health care provider. Copyright   2022 [facility]. All rights reserved. [place] [id number] - 12/22.

## 2023-07-11 NOTE — Clinical Note
Hi Dr. Eli  Cathie is stable with desvenlafaxine 50mg daily and is ready to return to  for ongoing refills. I have given her 90 days of medicine and advised she schedule a follow up with you within 90 days of this transition. Future recommendations for medication adjustments are in my note under PLAN and she can always be referred back for brief med stabilization if needed.   ZUHAIR Rao Collaborative Care Psychiatry Service [facility]

## 2023-07-13 ENCOUNTER — OFFICE VISIT (OUTPATIENT)
Dept: UROLOGY | Facility: CLINIC | Age: 43
End: 2023-07-13
Payer: COMMERCIAL

## 2023-07-13 VITALS
BODY MASS INDEX: 23.9 KG/M2 | HEIGHT: 64 IN | DIASTOLIC BLOOD PRESSURE: 74 MMHG | WEIGHT: 140 LBS | SYSTOLIC BLOOD PRESSURE: 112 MMHG | OXYGEN SATURATION: 100 % | HEART RATE: 68 BPM

## 2023-07-13 DIAGNOSIS — R39.15 URINARY URGENCY: ICD-10-CM

## 2023-07-13 DIAGNOSIS — N39.3 FEMALE STRESS INCONTINENCE: ICD-10-CM

## 2023-07-13 DIAGNOSIS — R35.0 URINARY FREQUENCY: Primary | ICD-10-CM

## 2023-07-13 LAB — RESIDUAL VOLUME (RV) (EXTERNAL): NORMAL

## 2023-07-13 PROCEDURE — 51798 US URINE CAPACITY MEASURE: CPT | Performed by: PHYSICIAN ASSISTANT

## 2023-07-13 PROCEDURE — 99214 OFFICE O/P EST MOD 30 MIN: CPT | Mod: 25 | Performed by: PHYSICIAN ASSISTANT

## 2023-07-13 ASSESSMENT — PAIN SCALES - GENERAL: PAINLEVEL: NO PAIN (0)

## 2023-07-13 NOTE — PROGRESS NOTES
Urology Clinic      Name: Lupe Sifuentes    MRN: [id number]   YOB: 1980  Accompanied at today's visit by:self                 Chief Complaint:   Urinary frequency/urgency          History of Present Illness:   July 13, 2023    HISTORY:   We have been following 42 year old Lupe Sifuentes for urinary urgency/frequency, KIANA. Last seen on 5/17/23. Has failed oxybutynin and vesicare due to dry mouth. On Myrbetriq 25mg daily and symptoms well controlled on this medication. Is very pleased. Has not been to PFPT. Patient voices no other concerns at this time.            Allergies:     Allergies   Allergen Reactions     Acetaminophen      vomiting      Oxycodone Nausea and Vomiting     vomiting   vomiting        Sulfa Antibiotics Hives and Rash     hives, PN: LW Reaction: HIVES       Oxycodone-Acetaminophen GI Disturbance and Nausea and Vomiting     Other reaction(s): Nausea and/or Vomiting              Medications:     Current Outpatient Medications   Medication Sig     benzoyl peroxide 5 % topical gel Apply topically to face once daily  Indications: Common Acne     desvenlafaxine (PRISTIQ) 50 MG 24 hr tablet Take 1 tablet (50 mg) by mouth daily     levonorgestrel (MIRENA) 20 MCG/DAY IUD 1 each by Intrauterine route     mirabegron (MYRBETRIQ) 25 MG 24 hr tablet Take 1 tablet (25 mg) by mouth daily     tretinoin (RETIN-A) 0.025 % external cream Apply a thin layer to face 2 nights each week and slowly increase to nightly use as tolerated     No current facility-administered medications for this visit.               Past  Surgical History:   No past surgical history on file.          Physical Exam:   There were no vitals filed for this visit.  PSYCH: NAD  EYES: EOMI  NEURO: AAO x3    LABS:   PVR 57mL    Creatinine   Date Value Ref Range Status   04/13/2023 0.80 0.51 - 0.95 mg/dL Final   04/06/2009 0.79 0.52 - 1.04 mg/dL Final     Comment:     New IDMS-traceable calibration  beginning 5/1/08             Assessment and Plan:   42 year old is a pleasant female who has  urinary urgency/frequency, KIANA     Plan:  -  PVR WNL.  - continue Myrbetriq 25mg daily.  - Consider PFPT in the future.   - consider third line options if fails conservative therapies.  - Consider UDS and cysto if fails conservative therapies.   - Patient doing well. Plan to follow-up in 1 year or sooner if needed.   - After discussing the assessment and plan with patient, patient verbalizes understanding and agrees to the above plan. All questions answered.     28 minutes spent on the date of the encounter doing chart review, review of outside records, review of test results, interpretation of tests, patient visit and documentation.      Monika Rolle PA-C  Urology  July 13, 2023      Patient Care Team:  Babita Eli MD as PCP - General (Family Medicine)  Monika Rolle PA-C as Physician Assistant  Marci Hernandez NP as Assigned Neuroscience Provider        Post-Care Instructions: I reviewed with the patient in detail post-care instructions. Patient is to wear sunprotection, and avoid picking at any of the treated lesions. Pt may apply Vaseline to crusted or scabbing areas. Detail Level: Detailed Anesthesia Volume In Cc: 0.4 Price (Use Numbers Only, No Special Characters Or $): 0 Consent: The patient's consent was obtained including but not limited to risks of crusting, scabbing, blistering, scarring, darker or lighter pigmentary change, recurrence, incomplete removal and infection.

## 2023-07-13 NOTE — LETTER
7/13/2023       RE: Lupe Sifuentes  [address]  [facility] [address]     Dear Colleague,    Thank you for referring your patient, Lupe Sifuentes, to the [facility] at [facility]. Please see a copy of my visit note below.    Urology Clinic      Name: Lupe Sifuentes    MRN: [id number]   YOB: 1980  Accompanied at today's visit by:self                 Chief Complaint:   Urinary frequency/urgency          History of Present Illness:   July 13, 2023    HISTORY:   We have been following 42 year old Lupe Sifuentes for urinary urgency/frequency, KIANA. Last seen on 5/17/23. Has failed oxybutynin and vesicare due to dry mouth. On Myrbetriq 25mg daily and symptoms well controlled on this medication. Is very pleased. Has not been to PFPT. Patient voices no other concerns at this time.            Allergies:     Allergies   Allergen Reactions    Acetaminophen      vomiting     Oxycodone Nausea and Vomiting     vomiting   vomiting       Sulfa Antibiotics Hives and Rash     hives, PN: LW Reaction: HIVES      Oxycodone-Acetaminophen GI Disturbance and Nausea and Vomiting     Other reaction(s): Nausea and/or Vomiting              Medications:     Current Outpatient Medications   Medication Sig    benzoyl peroxide 5 % topical gel Apply topically to face once daily  Indications: Common Acne    desvenlafaxine (PRISTIQ) 50 MG 24 hr tablet Take 1 tablet (50 mg) by mouth daily    levonorgestrel (MIRENA) 20 MCG/DAY IUD 1 each by Intrauterine route    mirabegron (MYRBETRIQ) 25 MG 24 hr tablet Take 1 tablet (25 mg) by mouth daily    tretinoin (RETIN-A) 0.025 % external cream Apply a thin layer to face 2 nights each week and slowly increase to nightly use as tolerated     No current facility-administered medications for this visit.               Past  Surgical History:   No past surgical history on file.          Physical Exam:   There  were no vitals filed for this visit.  PSYCH: NAD  EYES: EOMI  NEURO: AAO x3    LABS:   PVR 57mL    Creatinine   Date Value Ref Range Status   04/13/2023 0.80 0.51 - 0.95 mg/dL Final   04/06/2009 0.79 0.52 - 1.04 mg/dL Final     Comment:     New IDMS-traceable calibration  beginning 5/1/08            Assessment and Plan:   42 year old is a pleasant female who has  urinary urgency/frequency, KIANA     Plan:  -  PVR WNL.  - continue Myrbetriq 25mg daily.  - Consider PFPT in the future.   - consider third line options if fails conservative therapies.  - Consider UDS and cysto if fails conservative therapies.   - Patient doing well. Plan to follow-up in 1 year or sooner if needed.   - After discussing the assessment and plan with patient, patient verbalizes understanding and agrees to the above plan. All questions answered.     28 minutes spent on the date of the encounter doing chart review, review of outside records, review of test results, interpretation of tests, patient visit and documentation.      Monika Rolle PA-C  Urology  July 13, 2023      Patient Care Team:  Babita Eli MD as PCP - General (Family Medicine)  Monika Rolle PA-C as Physician Assistant  Marci Hernandez NP as Assigned Neuroscience Provider

## 2023-10-25 NOTE — ADDENDUM NOTE
Encounter addended by: Janessa Tirado, OTR on: 10/25/2023 1:28 PM   Actions taken: Clinical Note Signed, Flowsheet accepted, Episode resolved

## 2023-10-25 NOTE — PROGRESS NOTES
02/28/23 1300   Appointment Info   Treating Provider Janessa Tirado OTR/L   Visits Used 1   Medical Diagnosis Auditory processing disorder   Progress Note/Certification   Start Of Care Date 02/28/23   Onset of Illness/Injury or Date of Surgery 02/07/23   Certification date from 02/28/23   Certification date to 05/28/23   KX Modifier Statement I certify the need for these services furnished under this plan of treatment and while under my care.  (Physician co-signature of this document indicates review and certification of the therapy plan)   Goals   OT Goals 1   OT Goal 1   Goal Identifier symptom management   Goal Description Pt to I'ly utilize 2-3 compensatory strategies to manage sound sensitivity and maximize participation in daily living tasks such as childcare, home maintenance tasks, meal prep etc.   Goal Progress goal partially met   Target Date 03/24/23   Subjective Report   Subjective Report See eval   Treatment Interventions (OT)   Vestibular Interventions Self Care/Home Management   Self Care/Home Management   Self-Care/Home Mgmt/ADL, Compensatory, Meal Prep Minutes (11239) 15 Minutes   Skilled Intervention Skilled education in symptom management strategies to increase ease of and participation in I/ADLs   Patient Response/Progress Receptive   Treatment Detail Pt reports keeping high fidelity ear buds on her purse to use in noiser environments. OT provided additional enducaiton on compensatory strategies for sound including avoiding shopping at large stores/at busy times of day, use foam ear plugs or noise canceling headphones for very noisey environments, listening to soothing sounds (instrumental music), sitting in quiet places, limiting distractions (TV/radio) when talking to others etc. Pt reports she does get symptom relief from listening to music and agreeable to trialing additional strategies.   Progress goals progressing   Eval/Assessments   OT Eval, Low Complexity Minutes (07708) 23   Plan    Plan for next session f/u on implementation of strategies; teach additional strategies as indicated   Session Number   Authorization status [employer]         DISCHARGE  Reason for Discharge: Patient has failed to schedule further appointments.    Equipment Issued: handouts    Discharge Plan: Patient to continue home program.    Referring Provider:  Babita Eli

## 2023-12-19 ENCOUNTER — OFFICE VISIT (OUTPATIENT)
Dept: FAMILY MEDICINE | Facility: CLINIC | Age: 43
End: 2023-12-19
Payer: COMMERCIAL

## 2023-12-19 VITALS
WEIGHT: 139 LBS | SYSTOLIC BLOOD PRESSURE: 105 MMHG | DIASTOLIC BLOOD PRESSURE: 59 MMHG | TEMPERATURE: 97.7 F | HEART RATE: 61 BPM | OXYGEN SATURATION: 100 % | BODY MASS INDEX: 23.86 KG/M2

## 2023-12-19 DIAGNOSIS — F41.8 DEPRESSION WITH ANXIETY: Primary | ICD-10-CM

## 2023-12-19 RX ORDER — DESVENLAFAXINE 50 MG/1
50 TABLET, FILM COATED, EXTENDED RELEASE ORAL DAILY
Qty: 90 TABLET | Refills: 1 | Status: SHIPPED | OUTPATIENT
Start: 2023-12-19 | End: 2024-03-28

## 2023-12-19 ASSESSMENT — ANXIETY QUESTIONNAIRES
GAD7 TOTAL SCORE: 1
5. BEING SO RESTLESS THAT IT IS HARD TO SIT STILL: NOT AT ALL
3. WORRYING TOO MUCH ABOUT DIFFERENT THINGS: NOT AT ALL
7. FEELING AFRAID AS IF SOMETHING AWFUL MIGHT HAPPEN: NOT AT ALL
2. NOT BEING ABLE TO STOP OR CONTROL WORRYING: NOT AT ALL
1. FEELING NERVOUS, ANXIOUS, OR ON EDGE: SEVERAL DAYS
6. BECOMING EASILY ANNOYED OR IRRITABLE: NOT AT ALL
GAD7 TOTAL SCORE: 1
IF YOU CHECKED OFF ANY PROBLEMS ON THIS QUESTIONNAIRE, HOW DIFFICULT HAVE THESE PROBLEMS MADE IT FOR YOU TO DO YOUR WORK, TAKE CARE OF THINGS AT HOME, OR GET ALONG WITH OTHER PEOPLE: NOT DIFFICULT AT ALL

## 2023-12-19 ASSESSMENT — PATIENT HEALTH QUESTIONNAIRE - PHQ9
SUM OF ALL RESPONSES TO PHQ QUESTIONS 1-9: 3
5. POOR APPETITE OR OVEREATING: NOT AT ALL

## 2023-12-19 NOTE — NURSING NOTE
"43 year old  Chief Complaint   Patient presents with    Recheck Medication     Desvenlafaxine        Blood pressure 105/59, pulse 61, temperature 97.7  F (36.5  C), temperature source Skin, weight 63 kg (139 lb), SpO2 100%. Body mass index is 23.86 kg/m .  Patient Active Problem List   Diagnosis    Subcutaneous nodules    Sacroiliitis (H24)    IUD (intrauterine device) in place    History of depression    Esophageal reflux    Disorder of bladder    Postpartum depression    Back strain    Anxiety state    Allergy    Adjustment disorder with anxiety    Acne    Poor fetal growth, affecting management of mother, antepartum condition or complication       Wt Readings from Last 2 Encounters:   12/19/23 63 kg (139 lb)   07/13/23 63.5 kg (140 lb)     BP Readings from Last 3 Encounters:   12/19/23 105/59   07/13/23 112/74   05/17/23 110/64         Current Outpatient Medications   Medication    benzoyl peroxide 5 % topical gel    desvenlafaxine (PRISTIQ) 50 MG 24 hr tablet    levonorgestrel (MIRENA) 20 MCG/DAY IUD    mirabegron (MYRBETRIQ) 25 MG 24 hr tablet    tretinoin (RETIN-A) 0.025 % external cream     No current facility-administered medications for this visit.       Social History     Tobacco Use    Smoking status: Never    Smokeless tobacco: Never   Vaping Use    Vaping Use: Never used   Substance Use Topics    Alcohol use: Yes     Comment: 1-2 times weekly    Drug use: Never       Health Maintenance Due   Topic Date Due    ADVANCE CARE PLANNING  Never done    DEPRESSION ACTION PLAN  Never done    HIV SCREENING  Never done    HEPATITIS C SCREENING  Never done    YEARLY PREVENTIVE VISIT  07/29/2009    PHQ-9  11/09/2023       No results found for: \"PAP\"      December 19, 2023 3:35 PM   "

## 2023-12-19 NOTE — PROGRESS NOTES
CC: Recheck Medication (Desvenlafaxine )        ASSESSMENT/PLAN: 42 y/o F presenting today for medication check. Stable on Pristiq, no concerns. Plan for follow-up in 6 months (prior to moving) for physical and medication check, sooner with concerns.    Cathie was seen today for recheck medication.    Diagnoses and all orders for this visit:    Depression with anxiety  -     desvenlafaxine (PRISTIQ) 50 MG 24 hr tablet; Take 1 tablet (50 mg) by mouth daily        Worrisome signs and symptoms were discussed with Cathie and she was instructed to return to the clinic for concerning symptoms or to call with questions. All patient questions answered.    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 While there are a lot of behavior concerns for her students, she is really enjoying the work and her colleagues    Depression and anxiety. Desvenlafaxine was over the summer. [facility] and overall feels that this is working well for management of her depression and anxiety. Stress has been high ( lost his job, she started working again, and they recently accepted a job offer in [place]). Despite this, she feels she is managing well. No significant side effects on Pristiq  Has previously tried Wellbutrin, Prozac, lamictal (effective but too many side effects), Paxil, Effexor, sertraline (effective for a while, then stopped working), escitalopram (no effectiveness but well tolerated). She does not specifically remember effectiveness/side effects for other medications.           4/9/2023    11:19 PM 5/9/2023    10:51 AM 12/19/2023     3:56 PM   PHQ   PHQ-9 Total Score 8    8 3    3 3   Q9: Thoughts of better off dead/self-harm past 2 weeks Several days    Several days Not at all    Not at all Not at all   F/U: Thoughts of  suicide or self-harm Yes    Yes     F/U: Self harm-plan No    No     F/U: Self-harm action No    No     F/U: Safety concerns No    No           4/9/2023    11:19 PM 5/9/2023    10:50 AM 12/19/2023     3:56 PM   CHANDNI-7 SCORE   Total Score 13 (moderate anxiety) 7 (mild anxiety)    Total Score 13    13 7    7 1       OBJECTIVE:   /59 (BP Location: Left arm, Patient Position: Sitting, Cuff Size: Adult Regular)   Pulse 61   Temp 97.7  F (36.5  C) (Skin)   Wt 63 kg (139 lb)   SpO2 100%   BMI 23.86 kg/m    General: Well-appearing in NAD   HEENT: Normocephalic, atraumatic. Mucus membranes moist.   Resp: No respiratory distress   MSK: No peripheral edema.   Skin: No rashes or lesions noted.   Psych: Appropriate affect. Mood is good

## 2023-12-22 ENCOUNTER — HOSPITAL ENCOUNTER (OUTPATIENT)
Dept: MAMMOGRAPHY | Facility: CLINIC | Age: 43
Discharge: HOME | End: 2023-12-22
Attending: FAMILY MEDICINE | Admitting: FAMILY MEDICINE
Payer: COMMERCIAL

## 2023-12-22 DIAGNOSIS — Z12.31 VISIT FOR SCREENING MAMMOGRAM: ICD-10-CM

## 2023-12-22 PROCEDURE — 77067 SCR MAMMO BI INCL CAD: CPT

## 2024-01-09 ENCOUNTER — OFFICE VISIT (OUTPATIENT)
Dept: FAMILY MEDICINE | Facility: CLINIC | Age: 44
End: 2024-01-09

## 2024-01-09 VITALS
DIASTOLIC BLOOD PRESSURE: 71 MMHG | OXYGEN SATURATION: 98 % | HEART RATE: 72 BPM | RESPIRATION RATE: 18 BRPM | TEMPERATURE: 98.1 F | SYSTOLIC BLOOD PRESSURE: 108 MMHG

## 2024-01-09 DIAGNOSIS — R05.8 POST-VIRAL COUGH SYNDROME: Primary | ICD-10-CM

## 2024-01-09 NOTE — PROGRESS NOTES
CC: Cough (Feelings of being sick since 12/3/23, tested negative at home x6. Still not feeling like her normal. Noting when she walks up a flight of stairs will have SOB, tiredness, ears are itchy bilaterally. Mild soreness in throat, worse at night and morning. Dry cough. She had a friend with similar sx that tested negative for COVID at home but had a positive PCR test at her office.)        ASSESSMENT/PLAN: 44 y/o presenting with 1 month of cough, sore throat, fatigue. No evidence of secondary bacterial infection today and past the window to test for COVID. Suspect post-viral cough and possibly post-COVID syndrome. Discussed supportive cares. Can consider prednisone burst if symptoms not improving, patient will contact our office.    Cathie was seen today for cough.    Diagnoses and all orders for this visit:    Post-viral cough syndrome        Worrisome signs and symptoms were discussed with Cathie and she was instructed to return to the clinic for concerning symptoms or to call with questions. All patient questions answered.    Follow up: KARRI Eli MD/MPH  Family Medicine      SUBJECTIVE: Cathie is a 43 year old female who comes in with the following concerns:    Has been having symptoms intermittently since 12/3  Was feeling slow improvement at last visit, has been worse since.    Fatigue is ongoing  Sore throat in the morning and at night  Cough, still present. Nonproductive cough. Cough worse at night  Ear itching/discomfort.    Has been feeling more short of breath than normal, can't catch breath  No wheezing/coughing    Sinus pain/pressure has improved  No ongoing nasal discharge    Treatment: no treatments so far    Friend with similar symptoms was diagnosed with COVID, she never tested positive.      OBJECTIVE:   /71 (BP Location: Left arm, Patient Position: Sitting, Cuff Size: Adult Regular)   Pulse 72   Temp 98.1  F (36.7  C) (Temporal)   Resp 18   SpO2 98%   General: Alert and oriented  in no acute distress.  Skin: Clear without lesions or rashes.   Lymph: No anterior cervical lymphadenopathy.   Eyes: PERRL. EOMI.   ENT: TMs intact and pearly gray. Oropharynx moist without lesions or exudate. Supple neck.  Neuro: Grossly intact, nonfocal.  Cardio: RRR, normal S1 and S2, without murmurs, rubs, or gallops appreciated.    Resp: CTA bilaterally. Normal respiratory effort.   MSK: No deformities  Psych: Mood and affect appropriate.

## 2024-01-09 NOTE — NURSING NOTE
43 year old  Chief Complaint   Patient presents with    Cough     Feelings of being sick since 12/3/23, tested negative at home x6. Still not feeling like her normal. Noting when she walks up a flight of stairs will have SOB, tiredness, ears are itchy bilaterally. Mild soreness in throat, worse at night and morning. Dry cough. She had a friend with similar sx that tested negative for COVID at home but had a positive PCR test at her office.       There were no vitals taken for this visit. There is no height or weight on file to calculate BMI.  Patient Active Problem List   Diagnosis    Subcutaneous nodules    Sacroiliitis (H24)    IUD (intrauterine device) in place    History of depression    Esophageal reflux    Disorder of bladder    Postpartum depression    Back strain    Anxiety state    Allergy    Adjustment disorder with anxiety    Acne    Poor fetal growth, affecting management of mother, antepartum condition or complication       Wt Readings from Last 2 Encounters:   12/19/23 63 kg (139 lb)   07/13/23 63.5 kg (140 lb)     BP Readings from Last 3 Encounters:   12/19/23 105/59   07/13/23 112/74   05/17/23 110/64         Current Outpatient Medications   Medication    benzoyl peroxide 5 % topical gel    desvenlafaxine (PRISTIQ) 50 MG 24 hr tablet    levonorgestrel (MIRENA) 20 MCG/DAY IUD    mirabegron (MYRBETRIQ) 25 MG 24 hr tablet    tretinoin (RETIN-A) 0.025 % external cream     No current facility-administered medications for this visit.       Social History     Tobacco Use    Smoking status: Never    Smokeless tobacco: Never   Vaping Use    Vaping Use: Never used   Substance Use Topics    Alcohol use: Yes     Comment: 1-2 times weekly    Drug use: Never       Health Maintenance Due   Topic Date Due    ADVANCE CARE PLANNING  Never done    DEPRESSION ACTION PLAN  Never done    HIV SCREENING  Never done    HEPATITIS C SCREENING  Never done    YEARLY PREVENTIVE VISIT  07/29/2009       No results found for:  "\"PAP\"      January 9, 2024 2:37 PM    "

## 2024-01-09 NOTE — PATIENT INSTRUCTIONS
Add Flonase nasal spray  Humidifer at night    If worsening, let me know if you want to try a steroid prescription.

## 2024-03-25 DIAGNOSIS — R39.15 URINARY URGENCY: ICD-10-CM

## 2024-03-25 RX ORDER — MIRABEGRON 25 MG/1
25 TABLET, FILM COATED, EXTENDED RELEASE ORAL DAILY
Qty: 90 TABLET | Refills: 3 | Status: SHIPPED | OUTPATIENT
Start: 2024-03-25 | End: 2024-05-31 | Stop reason: DRUGHIGH

## 2024-03-27 ENCOUNTER — MYC MEDICAL ADVICE (OUTPATIENT)
Dept: FAMILY MEDICINE | Facility: CLINIC | Age: 44
End: 2024-03-27

## 2024-03-27 DIAGNOSIS — F41.8 DEPRESSION WITH ANXIETY: ICD-10-CM

## 2024-03-27 DIAGNOSIS — L70.9 ACNE, UNSPECIFIED ACNE TYPE: Primary | ICD-10-CM

## 2024-03-28 RX ORDER — DESVENLAFAXINE 50 MG/1
50 TABLET, FILM COATED, EXTENDED RELEASE ORAL DAILY
Qty: 90 TABLET | Refills: 0 | Status: SHIPPED | OUTPATIENT
Start: 2024-03-28 | End: 2024-05-24

## 2024-03-28 RX ORDER — BENZOYL PEROXIDE 5 G/100G
GEL TOPICAL
Qty: 60 G | Refills: 1 | Status: SHIPPED | OUTPATIENT
Start: 2024-03-28

## 2024-03-28 NOTE — TELEPHONE ENCOUNTER
Benzoyl peroxide 5 % topical gel + Desvenlafaxine (Pristiq) 50 mg    Last Office Visit: 1/9/24  Future [facility] Appointments: 5/24/24    Medication last refilled:   Historical - Benzoyl peroxide 5%  7/11/23 #90 with 0 refill(s) - Desvenlafaxine    PHQ-9 / CHANDNI-7 Scores  2/17/23 5/9/23 12/19/23   CHANDNI-7 Score DocFlow 8 7 1   PHQ-9 Score DocFlow 8 3 3     Vital Signs Systolic Diastolic   1/9/24 108 71   7/13/23 112 74   1/5/23 114 65     Required labs per protocol:    LAB REF RANGE 4/13/23   GFR >60 mL/min/1.73m2  >90   Creatinine 0.67-1.17 mg/dL 0.80     Prescription approved per [facility] Refill Protocol.    Gudelia Becerra, EDWARDN, RN, CCM

## 2024-03-29 ENCOUNTER — TELEPHONE (OUTPATIENT)
Dept: FAMILY MEDICINE | Facility: CLINIC | Age: 44
End: 2024-03-29

## 2024-03-29 NOTE — TELEPHONE ENCOUNTER
Prior Authorization Retail Medication Request    Medication/Dose: desvenlafaxine (PRISTIQ) 50 MG 24 hr tablet  Diagnosis and ICD code (if different than what is on RX):  Same  New/renewal/insurance change PA/secondary ins. PA: Renewal  Previously Tried and Failed:  See office visit note 3/7/23  Rationale: See above    Insurance   Primary: Same  Insurance ID:  Same    Secondary (if applicable): N/A  Insurance ID: N/A    Pharmacy Information (if different than what is on RX)  Name:  Same  Phone:  Same  Fax:  Same      Urgent request, pt is out of medication.    Gopal BARNES, RN  03/29/24 4:08 PM

## 2024-03-29 NOTE — TELEPHONE ENCOUNTER
Prior Authorization Approval    Authorization Effective Date: 2/28/2024  Authorization Expiration Date: 3/29/2025  Medication: desvenlafaxine (PRISTIQ) 50 MG 24 hr tablet - APPROVED  Approved Dose/Quantity:    Reference #:     Insurance Company: [employer] Non-Specialty PA's - Phone [phone] Fax [phone]  Expected CoPay:       CoPay Card Available:      Foundation Assistance Needed:    Which Pharmacy is filling the prescription (Not needed for infusion/clinic administered): [facility] - [place], [place] - [address]  Pharmacy Notified:  YES  Patient Notified:  YES

## 2024-03-29 NOTE — TELEPHONE ENCOUNTER
Retail Pharmacy Prior Authorization Team   Phone: [phone]    PA Initiation    Medication: desvenlafaxine (PRISTIQ) 50 MG 24 hr tablet  Insurance Company: [employer] Non-Specialty PA's - Phone [phone] Fax [phone]  Pharmacy Filling the Rx: [facility] [place] [address]  Filling Pharmacy Phone: [phone]  Filling Pharmacy Fax: [phone]  Start Date: 3/29/2024

## 2024-03-29 NOTE — TELEPHONE ENCOUNTER
Contacted patient to advise of the approval.  Patient states she was able to order 90 day supply with a coupon from the pharmacy.  Patient received medication for $60.  I advised the PA was back dated to 2/28/2024, so she can call her pharmacy to have them reprocess to see if it is cheaper than with the coupon.  Hopefully get a refund.  Patient agreed with plan.  PA team number given.

## 2024-05-24 ENCOUNTER — OFFICE VISIT (OUTPATIENT)
Dept: FAMILY MEDICINE | Facility: CLINIC | Age: 44
End: 2024-05-24
Payer: COMMERCIAL

## 2024-05-24 VITALS
DIASTOLIC BLOOD PRESSURE: 67 MMHG | WEIGHT: 140 LBS | TEMPERATURE: 98 F | BODY MASS INDEX: 23.32 KG/M2 | SYSTOLIC BLOOD PRESSURE: 120 MMHG | HEIGHT: 65 IN | OXYGEN SATURATION: 100 % | HEART RATE: 60 BPM

## 2024-05-24 DIAGNOSIS — Z13.1 SCREENING FOR DIABETES MELLITUS (DM): ICD-10-CM

## 2024-05-24 DIAGNOSIS — Z00.00 ROUTINE GENERAL MEDICAL EXAMINATION AT A HEALTH CARE FACILITY: Primary | ICD-10-CM

## 2024-05-24 DIAGNOSIS — Z12.4 SCREENING FOR CERVICAL CANCER: ICD-10-CM

## 2024-05-24 DIAGNOSIS — F41.8 DEPRESSION WITH ANXIETY: ICD-10-CM

## 2024-05-24 DIAGNOSIS — Z11.59 ENCOUNTER FOR HEPATITIS C SCREENING TEST FOR LOW RISK PATIENT: ICD-10-CM

## 2024-05-24 DIAGNOSIS — T83.32XA INTRAUTERINE CONTRACEPTIVE DEVICE THREADS LOST, INITIAL ENCOUNTER: ICD-10-CM

## 2024-05-24 DIAGNOSIS — Z13.220 SCREENING FOR HYPERLIPIDEMIA: ICD-10-CM

## 2024-05-24 LAB — HBA1C MFR BLD: 5.1 % (ref 0–5.6)

## 2024-05-24 RX ORDER — DESVENLAFAXINE 50 MG/1
50 TABLET, FILM COATED, EXTENDED RELEASE ORAL DAILY
Qty: 90 TABLET | Refills: 3 | Status: SHIPPED | OUTPATIENT
Start: 2024-05-24

## 2024-05-24 SDOH — HEALTH STABILITY: PHYSICAL HEALTH: ON AVERAGE, HOW MANY DAYS PER WEEK DO YOU ENGAGE IN MODERATE TO STRENUOUS EXERCISE (LIKE A BRISK WALK)?: 2 DAYS

## 2024-05-24 ASSESSMENT — ANXIETY QUESTIONNAIRES
5. BEING SO RESTLESS THAT IT IS HARD TO SIT STILL: NOT AT ALL
7. FEELING AFRAID AS IF SOMETHING AWFUL MIGHT HAPPEN: NOT AT ALL
7. FEELING AFRAID AS IF SOMETHING AWFUL MIGHT HAPPEN: NOT AT ALL
GAD7 TOTAL SCORE: 5
4. TROUBLE RELAXING: SEVERAL DAYS
IF YOU CHECKED OFF ANY PROBLEMS ON THIS QUESTIONNAIRE, HOW DIFFICULT HAVE THESE PROBLEMS MADE IT FOR YOU TO DO YOUR WORK, TAKE CARE OF THINGS AT HOME, OR GET ALONG WITH OTHER PEOPLE: SOMEWHAT DIFFICULT
3. WORRYING TOO MUCH ABOUT DIFFERENT THINGS: NOT AT ALL
1. FEELING NERVOUS, ANXIOUS, OR ON EDGE: MORE THAN HALF THE DAYS
GAD7 TOTAL SCORE: 5
6. BECOMING EASILY ANNOYED OR IRRITABLE: NOT AT ALL
2. NOT BEING ABLE TO STOP OR CONTROL WORRYING: MORE THAN HALF THE DAYS
GAD7 TOTAL SCORE: 5
8. IF YOU CHECKED OFF ANY PROBLEMS, HOW DIFFICULT HAVE THESE MADE IT FOR YOU TO DO YOUR WORK, TAKE CARE OF THINGS AT HOME, OR GET ALONG WITH OTHER PEOPLE?: SOMEWHAT DIFFICULT

## 2024-05-24 ASSESSMENT — SOCIAL DETERMINANTS OF HEALTH (SDOH): HOW OFTEN DO YOU GET TOGETHER WITH FRIENDS OR RELATIVES?: ONCE A WEEK

## 2024-05-24 ASSESSMENT — PATIENT HEALTH QUESTIONNAIRE - PHQ9
SUM OF ALL RESPONSES TO PHQ QUESTIONS 1-9: 10
SUM OF ALL RESPONSES TO PHQ QUESTIONS 1-9: 10
10. IF YOU CHECKED OFF ANY PROBLEMS, HOW DIFFICULT HAVE THESE PROBLEMS MADE IT FOR YOU TO DO YOUR WORK, TAKE CARE OF THINGS AT HOME, OR GET ALONG WITH OTHER PEOPLE: SOMEWHAT DIFFICULT

## 2024-05-24 NOTE — PROGRESS NOTES
Preventive Care Visit  [facility]  Babita Eli MD, Family Medicine  May 24, 2024      Assessment & Plan     Cathie was seen today for physical.    Diagnoses and all orders for this visit:    Routine general medical examination at a health care facility    Depression with anxiety  -     desvenlafaxine (PRISTIQ) 50 MG 24 hr tablet; Take 1 tablet (50 mg) by mouth daily    Intrauterine contraceptive device threads lost, initial encounter  -     US Pelvic W/Transvaginal*; Future    Screening for cervical cancer  -     Pap imaged thin layer screen with HPV - recommended age 30 - 65 years    Screening for hyperlipidemia  -     Lipid Panel (Moran); Future    Screening for diabetes mellitus (DM)  -     Hemoglobin A1c; Future    Encounter for hepatitis C screening test for low risk patient  -     Hepatitis C Screen Reflex to HCV RNA Quant and Genotype; Future      Continue current medications  Pelvic US for IUD localization given strings not visualized on exam today    Counseling  Appropriate preventive services were discussed with this patient, including applicable screening as appropriate for fall prevention, nutrition, physical activity, Tobacco-use cessation, weight loss and cognition.  Checklist reviewing preventive services available has been given to the patient.  Reviewed patient's diet, addressing concerns and/or questions.   She is at risk for lack of exercise and has been provided with information to increase physical activity for the benefit of her well-being.   The patient's PHQ-9 score is consistent with moderate depression. She was provided with information regarding depression.       Return in about 53 weeks (around 5/30/2025) for Annual Wellness Visit.    Subjective   Cathie is a 43 year old, presenting for the following:  Physical (PAP)         HPI    Gynecological history:  Menstrual/PMS/menopausal symptoms: regular, light periods  Last Pap:  5/2019, result Normal  History of abnormal Paps: Yes,  had trichomoniasis but normal since then  Would you like to become pregnant in the next year? 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 Feels overwhelmed but this week has just started to feel better. No safety concerns today despite high PHQ9 - positive item 9. Seeing therapist regularly.    Health Maintenance Due   Topic Date Due    ADVANCE CARE PLANNING  Never done    DEPRESSION ACTION PLAN  Never done    HEPATITIS C SCREENING  Never done    LIPID  Never done    PAP  01/23/2024 5/24/2024   General Health   How would you rate your overall physical health? Good   Feel stress (tense, anxious, or unable to sleep) To some extent   (!) STRESS CONCERN      5/24/2024   Nutrition   Three or more servings of calcium each day? Yes   Diet: Vegetarian/vegan   How many servings of fruit and vegetables per day? 4 or more   How many sweetened beverages each day? 0-1         5/24/2024   Exercise   Days per week of moderate/strenous exercise 2 days   (!) EXERCISE CONCERN      5/24/2024   Social Factors   Frequency of gathering with friends or relatives Once a week   Worry food won't last until get money to buy more No   Food not last or not have enough money for food? No   Do you have housing?  Yes   Are you worried about losing your housing? No   Lack of transportation? No   Unable to get utilities (heat,electricity)? No         5/24/2024   Dental   Dentist two times every year? Yes         5/24/2024   TB Screening   Were you born outside of the [place]? No       Today's PHQ-9 Score:       5/24/2024    11:22 AM   PHQ-9 SCORE   PHQ-9 Total Score MyChart 10 (Moderate depression)   PHQ-9 Total Score 10         5/24/2024   Substance Use   Alcohol more than 3/day or more than 7/wk No   Do you use any other substances recreationally? (!) ALCOHOL     Social History  "    Tobacco Use    Smoking status: Never    Smokeless tobacco: Never   Vaping Use    Vaping status: Never Used   Substance Use Topics    Alcohol use: Yes     Comment: 1-2 times weekly    Drug use: Never           12/22/2023   LAST FHS-7 RESULTS   1st degree relative breast or ovarian cancer Yes    No   Any relative bilateral breast cancer No    No   Any male have breast cancer No    No   Any ONE woman have BOTH breast AND ovarian cancer No    No   Any woman with breast cancer before 50yrs No    No   2 or more relatives with breast AND/OR ovarian cancer No    No   2 or more relatives with breast AND/OR bowel cancer Yes    No     Mammogram Screening - Mammogram every 1-2 years updated in Health Maintenance based on mutual decision making          5/24/2024   One time HIV Screening   Previous HIV test? Yes         5/24/2024   STI Screening   New sexual partner(s) since last STI/HIV test? No     History of abnormal Pap smear: No - age 30- 64 PAP with HPV every 5 years recommended       ASCVD Risk   The ASCVD Risk score (Cristal IBSWAS, et al., 2019) failed to calculate for the following reasons:    Cannot find a previous HDL lab    Cannot find a previous total cholesterol lab        5/24/2024   Contraception/Family Planning   Questions about contraception or family planning No     Reviewed and updated as needed this visit by Provider    Allergies  Meds  Problems  Med Hx  Surg Hx  Fam Hx   Sexual   Activity             Objective    Exam  /67 (BP Location: Right arm, Patient Position: Right side, Cuff Size: Adult Regular)   Pulse 60   Temp 98  F (36.7  C) (Temporal)   Ht 1.638 m (5' 4.5\")   Wt 63.5 kg (140 lb)   LMP 04/24/2024   SpO2 100%   BMI 23.66 kg/m     Estimated body mass index is 23.66 kg/m  as calculated from the following:    Height as of this encounter: 1.638 m (5' 4.5\").    Weight as of this encounter: 63.5 kg (140 lb).    Physical Exam  GENERAL: alert and no distress  EYES: Eyes " grossly normal to inspection, PERRL and conjunctivae and sclerae normal  HENT: ear canals and TM's normal, nose and mouth without ulcers or lesions  NECK: no adenopathy, no asymmetry, masses, or scars  RESP: lungs clear to auscultation - no rales, rhonchi or wheezes  BREAST: normal without masses, tenderness or nipple discharge and no palpable axillary masses or adenopathy  CV: regular rate and rhythm, normal S1 S2, no S3 or S4, no murmur, click or rub, no peripheral edema  ABDOMEN: soft, nontender, no hepatosplenomegaly, no masses and bowel sounds normal   (female): normal female external genitalia, normal urethral meatus, normal vaginal mucosa. Cervix visualized and normal - Pap collected. IUD strings are not visualized  MS: no gross musculoskeletal defects noted, no edema  SKIN: no suspicious lesions or rashes  NEURO: Normal strength and tone, mentation intact and speech normal  PSYCH: mentation appears normal, affect normal/bright        Signed Electronically by: Babita Eli MD

## 2024-05-24 NOTE — PATIENT INSTRUCTIONS
"Good to see you today!  Schedule a pelvic ultrasound to verify IUD position.  To schedule your imaging study, please call [phone].    They are located at the [facility]  [address] [place] [id number]     Medication refill sent to pharmacy.  We will draw lab tests today. I will contact you in the next 2-3 business days with the results of these labs.     Preventive Care Advice   This is general advice we often give to help people stay healthy. Your care team may have specific advice just for you. Please talk to your care team about your own preventive care needs.  Lifestyle  Exercise at least 150 minutes each week (30 minutes a day, 5 days a week).  Do muscle strengthening activities 2 days a week. These help control your weight and prevent disease.  No smoking.  Wear sunscreen to prevent skin cancer.  Have your home tested for radon every 2 to 5 years. Radon is a colorless, odorless gas that can harm your lungs. To learn more, go to www.health.[facility].mn.us and search for \"Radon in Homes.\"  Keep guns unloaded and locked up in a safe place like a safe or gun vault, or, use a gun lock and hide the keys. Always lock away bullets separately. To learn more, visit [employer].mn.gov and search for \"safe gun storage.\"  Nutrition  Eat 5 or more servings of fruits and vegetables each day.  Try wheat bread, brown rice and whole grain pasta (instead of white bread, rice, and pasta).  Get enough calcium and vitamin D. Check the label on foods and aim for 100% of the RDA (recommended daily allowance).  Regular exams  Have a dental exam and cleaning every 6 months.  See your health care team every year to talk about:  Any changes in your health.  Any medicines your care team has prescribed.  Preventive care, family planning, and ways to prevent chronic diseases.  Shots (vaccines)   HPV shots (up to age 26), if you've never had them before.  Hepatitis B shots (up to age 59), if you've never had them " before.  COVID-19 shot: Get this shot when it's due.  Flu shot: Get a flu shot every year.  Tetanus shot: Get a tetanus shot every 10 years.  Pneumococcal, hepatitis A, and RSV shots: Ask your care team if you need these based on your risk.  Shingles shot (for age 50 and up).  General health tests  Diabetes screening:  Starting at age 35, Get screened for diabetes at least every 3 years.  If you are younger than age 35, ask your care team if you should be screened for diabetes.  Cholesterol test: At age 39, start having a cholesterol test every 5 years, or more often if advised.  Bone density scan (DEXA): At age 50, ask your care team if you should have this scan for osteoporosis (brittle bones).  Hepatitis C: Get tested at least once in your life.  Abdominal aortic aneurysm screening: Talk to your doctor about having this screening if you:  Have ever smoked; and  Are biologically male; and  Are between the ages of 65 and 75.  STIs (sexually transmitted infections)  Before age 24: Ask your care team if you should be screened for STIs.  After age 24: Get screened for STIs if you're at risk. You are at risk for STIs (including HIV) if:  You are sexually active with more than one person.  You don't use condoms every time.  You or a partner was diagnosed with a sexually transmitted infection.  If you are at risk for HIV, ask about PrEP medicine to prevent HIV.  Get tested for HIV at least once in your life, whether you are at risk for HIV or not.  Cancer screening tests  Cervical cancer screening: If you have a cervix, begin getting regular cervical cancer screening tests at age 21. Most people who have regular screenings with normal results can stop after age 65. Talk about this with your provider.  Breast cancer scan (mammogram): If you've ever had breasts, begin having regular mammograms starting at age 40. This is a scan to check for breast cancer.  Colon cancer screening: It is important to start screening for  colon cancer at age 45.  Have a colonoscopy test every 10 years (or more often if you're at risk) Or, ask your provider about stool tests like a FIT test every year or Cologuard test every 3 years.  To learn more about your testing options, visit: www.[employer]/872256.pdf.  For help making a decision, visit: christiano/yt50053.  Prostate cancer screening test: If you have a prostate and are age 55 to 69, ask your provider if you would benefit from a yearly prostate cancer screening test.  Lung cancer screening: If you are a current or former smoker age 50 to 80, ask your care team if ongoing lung cancer screenings are right for you.  For informational purposes only. Not to replace the advice of your health care provider. Copyright   2023 [facility] [employer]. All rights reserved. Clinically reviewed by the [facility] Transitions Program. [employer] 258098 - REV 04/24.    Learning About Stress  What is stress?     Stress is your body's response to a hard situation. Your body can have a physical, emotional, or mental response. Stress is a fact of life for most people, and it affects everyone differently. What causes stress for you may not be stressful for someone else.  A lot of things can cause stress. You may feel stress when you go on a job interview, take a test, or run a race. This kind of short-term stress is normal and even useful. It can help you if you need to work hard or react quickly. For example, stress can help you finish an important job on time.  Long-term stress is caused by ongoing stressful situations or events. Examples of long-term stress include long-term health problems, ongoing problems at work, or conflicts in your family. Long-term stress can harm your health.  How does stress affect your health?  When you are stressed, your body responds as though you are in danger. It makes hormones that speed up your heart, make you breathe faster, and give you a burst of energy. This is called  the fight-or-flight stress response. If the stress is over quickly, your body goes back to normal and no harm is done.  But if stress happens too often or lasts too long, it can have bad effects. Long-term stress can make you more likely to get sick, and it can make symptoms of some diseases worse. If you tense up when you are stressed, you may develop neck, shoulder, or low back pain. Stress is linked to high blood pressure and heart disease.  Stress also harms your emotional health. It can make you abreu, tense, or depressed. Your relationships may suffer, and you may not do well at work or school.  What can you do to manage stress?  You can try these things to help manage stress:   Do something active. Exercise or activity can help reduce stress. Walking is a great way to get started. Even everyday activities such as housecleaning or yard work can help.  Try yoga or yvon chi. These techniques combine exercise and meditation. You may need some training at first to learn them.  Do something you enjoy. For example, listen to music or go to a movie. Practice your hobby or do volunteer work.  Meditate. This can help you relax, because you are not worrying about what happened before or what may happen in the future.  Do guided imagery. Imagine yourself in any setting that helps you feel calm. You can use online videos, books, or a teacher to guide you.  Do breathing exercises. For example:  From a standing position, bend forward from the waist with your knees slightly bent. Let your arms dangle close to the floor.  Breathe in slowly and deeply as you return to a standing position. Roll up slowly and lift your head last.  Hold your breath for just a few seconds in the standing position.  Breathe out slowly and bend forward from the waist.  Let your feelings out. Talk, laugh, cry, and express anger when you need to. Talking with supportive friends or family, a counselor, or a yanet leader about your feelings is a healthy  "way to relieve stress. Avoid discussing your feelings with people who make you feel worse.  Write. It may help to write about things that are bothering you. This helps you find out how much stress you feel and what is causing it. When you know this, you can find better ways to cope.  What can you do to prevent stress?  You might try some of these things to help prevent stress:  Manage your time. This helps you find time to do the things you want and need to do.  Get enough sleep. Your body recovers from the stresses of the day while you are sleeping.  Get support. Your family, friends, and community can make a difference in how you experience stress.  Limit your news feed. Avoid or limit time on social media or news that may make you feel stressed.  Do something active. Exercise or activity can help reduce stress. Walking is a great way to get started.  Where can you learn more?  Go to https://www.[employer].net/patiented  Enter N032 in the search box to learn more about \"Learning About Stress.\"  Current as of: October 24, 2023               Content Version: 14.0    4256-3723 [employer].   Care instructions adapted under license by your healthcare professional. If you have questions about a medical condition or this instruction, always ask your healthcare professional. [employer] disclaims any warranty or liability for your use of this information.      "

## 2024-05-25 LAB
CHOLEST SERPL-MCNC: 165 MG/DL
FASTING STATUS PATIENT QL REPORTED: NO
HCV AB SERPL QL IA: NONREACTIVE
HDLC SERPL-MCNC: 58 MG/DL
LDLC SERPL CALC-MCNC: 91 MG/DL
NONHDLC SERPL-MCNC: 107 MG/DL
TRIGL SERPL-MCNC: 82 MG/DL

## 2024-05-28 LAB
HPV HR 12 DNA CVX QL NAA+PROBE: NEGATIVE
HPV16 DNA CVX QL NAA+PROBE: NEGATIVE
HPV18 DNA CVX QL NAA+PROBE: NEGATIVE
HUMAN PAPILLOMA VIRUS FINAL DIAGNOSIS: NORMAL

## 2024-05-30 ENCOUNTER — ANCILLARY PROCEDURE (OUTPATIENT)
Dept: ULTRASOUND IMAGING | Facility: CLINIC | Age: 44
End: 2024-05-30
Attending: FAMILY MEDICINE
Payer: COMMERCIAL

## 2024-05-30 DIAGNOSIS — T83.32XA INTRAUTERINE CONTRACEPTIVE DEVICE THREADS LOST, INITIAL ENCOUNTER: ICD-10-CM

## 2024-05-30 PROCEDURE — 76830 TRANSVAGINAL US NON-OB: CPT | Mod: GC | Performed by: RADIOLOGY

## 2024-05-30 PROCEDURE — 76856 US EXAM PELVIC COMPLETE: CPT | Mod: GC | Performed by: RADIOLOGY

## 2024-05-31 ENCOUNTER — VIRTUAL VISIT (OUTPATIENT)
Dept: UROLOGY | Facility: CLINIC | Age: 44
End: 2024-05-31
Payer: COMMERCIAL

## 2024-05-31 DIAGNOSIS — R35.0 URINARY FREQUENCY: ICD-10-CM

## 2024-05-31 DIAGNOSIS — R39.15 URINARY URGENCY: Primary | ICD-10-CM

## 2024-05-31 DIAGNOSIS — N39.3 FEMALE STRESS INCONTINENCE: ICD-10-CM

## 2024-05-31 LAB
BKR LAB AP GYN ADEQUACY: NORMAL
BKR LAB AP GYN INTERPRETATION: NORMAL
BKR LAB AP LMP: NORMAL
BKR LAB AP PREVIOUS ABNORMAL: NORMAL
PATH REPORT.COMMENTS IMP SPEC: NORMAL
PATH REPORT.COMMENTS IMP SPEC: NORMAL
PATH REPORT.RELEVANT HX SPEC: NORMAL

## 2024-05-31 PROCEDURE — 99214 OFFICE O/P EST MOD 30 MIN: CPT | Mod: 95 | Performed by: PHYSICIAN ASSISTANT

## 2024-05-31 RX ORDER — MIRABEGRON 50 MG/1
50 TABLET, EXTENDED RELEASE ORAL DAILY
Qty: 90 TABLET | Refills: 3 | Status: SHIPPED | OUTPATIENT
Start: 2024-05-31

## 2024-05-31 NOTE — LETTER
5/31/2024       RE: Lupe Sifuentes  [address]  [facility] [address]     Dear Colleague,    Thank you for referring your patient, Lupe Sifuentes, to the [facility] at [facility]. Please see a copy of my visit note below.    Virtual Visit Details    Type of service:  Video Visit     Originating Location (pt. Location): Home    Distant Location (provider location):  On-site  Platform used for Video Visit: Stickybits  Start 2:50 pm  End 3:00 pm    Urology Clinic    Name: Lupe Sifuentes    MRN: [id number]   YOB: 1980  Accompanied at today's visit by:self              Assessment and Plan:   43 year old female with urinary urgency/frequency, KIANA    - educated patient that stress can affect bladder symptoms.  - increase myrbetriq to 50mg daily. If too expensive try vesicare. If myrberiq covered but symptoms perisist consider adding vesicare with myrbetriq. Discussed potential side effects. Advise to contact clinic if develops feeling of incomplete bladder emptying.   - follow-up once moves back from [place].   - After discussing the assessment and plan with patient, patient verbalized understanding and agreed to the above plan. All questions answered.     15 minutes were spent today on the date of the encounter in reviewing the EMR, direct patient care, coordination of care and documentation in addition to ordering myrbetriq.     Giorgi Rolle PA-C  May 31, 2024    Patient Care Team:  Babita Eli MD as PCP - General (Family Medicine)  Giorgi Rolle PA-C as Physician Assistant  Marci Hernandez NP as Assigned Neuroscience Provider  [facility] as Assigned PCP  Giorgi Rolle PA-C as Assigned Surgical Provider  GIORGI ROLLE          Chief Complaint:   Urgency/frequency         History of Present Illness:   May 31, 2024    HISTORY: Lupe Sifuentes is a 43 year old female is here for follow-up. We  have been following her for  urinary urgency/frequency, KIANA. Has failed oxybutynin and vesicare due to dry mouth in the past. Last seen on 7/13/23 and reported her symptoms were well controlled on myrbetriq 25mg daily. Did not go to [facility]. Here today for follow-up.  States the Myrbetriq is no longer working as well for her for the past several months. Reports strong urgency to void to the point where she almost doesn't make it. Does not report KIANA. Has been under a lot of stress as planning to move to [place] for 1 year with her family. Plan to move at end of June 2024. Patient voices no other concerns at this time.            Past Medical History:     Past Medical History:   Diagnosis Date    Postpartum depression     Formatting of this note might be different from the original.  One to One Replacement            Past Surgical History:   No past surgical history on file.         Social History:     Social History     Tobacco Use    Smoking status: Never    Smokeless tobacco: Never   Substance Use Topics    Alcohol use: Yes     Comment: 1-2 times weekly            Family History:     Family History   Problem Relation Age of Onset    Breast Cancer Mother     Colon Cancer Maternal Aunt               Allergies:     Allergies   Allergen Reactions    Oxycodone Nausea and Vomiting     vomiting   vomiting       Sulfa Antibiotics Hives and Rash     hives, PN: LW Reaction: HIVES      Oxycodone-Acetaminophen GI Disturbance and Nausea and Vomiting     Other reaction(s): Nausea and/or Vomiting              Medications:     Current Outpatient Medications   Medication Sig Dispense Refill    benzoyl peroxide 5 % topical gel Apply topically to face once daily  Indications: Common Acne 60 g 1    desvenlafaxine (PRISTIQ) 50 MG 24 hr tablet Take 1 tablet (50 mg) by mouth daily 90 tablet 3    levonorgestrel (MIRENA) 20 MCG/DAY IUD 1 each by Intrauterine route       No current facility-administered medications for this visit.              Review of Systems:    ROS: 14 point ROS neg other than the symptoms noted above in the HPI.          Physical Exam:   Last menstrual period 04/24/2024.  Data Unavailable, There is no height or weight on file to calculate BMI., 0 lbs 0 oz  Gen appearance: Age-appropriate appearing female in NAD.   HEENT:  EOMI, conjunctiva clear/white. Normal ROM of neck for age.   Psych:  alert , In no acute distress.  Neuro:  A&Ox3  Resp:  Normal respiratory effort; not in acute respiratory distress.          Data:    Labs:    Creatinine   Date Value Ref Range Status   04/13/2023 0.80 0.51 - 0.95 mg/dL Final   04/06/2009 0.79 0.52 - 1.04 mg/dL Final     Comment:     New IDMS-traceable calibration  beginning 5/1/08            GIORGI LANCASTER PA-C

## 2024-05-31 NOTE — PROGRESS NOTES
Virtual Visit Details    Type of service:  Video Visit     Originating Location (pt. Location): Home    Distant Location (provider location):  On-site  Platform used for Video Visit: [employer]  Start 2:50 pm  End 3:00 pm    Urology Clinic    Name: Lupe Sifuentes    MRN: [id number]   YOB: 1980  Accompanied at today's visit by:self              Assessment and Plan:   43 year old female with urinary urgency/frequency, KIANA    - educated patient that stress can affect bladder symptoms.  - increase myrbetriq to 50mg daily. If too expensive try vesicare. If myrberiq covered but symptoms perisist consider adding vesicare with myrbetriq. Discussed potential side effects. Advise to contact clinic if develops feeling of incomplete bladder emptying.   - follow-up once moves back from [place].   - After discussing the assessment and plan with patient, patient verbalized understanding and agreed to the above plan. All questions answered.     15 minutes were spent today on the date of the encounter in reviewing the EMR, direct patient care, coordination of care and documentation in addition to ordering myrbetriq.     Giorgi Rolle PA-C  May 31, 2024    Patient Care Team:  Babita Eli MD as PCP - General (Family Medicine)  Giorgi Rolle PA-C as Physician Assistant  Marci Hernandez NP as Assigned Neuroscience Provider  [facility] as Assigned PCP  Giorgi Rolle PA-C as Assigned Surgical Provider  GIORGI ROLLE          Chief Complaint:   Urgency/frequency         History of Present Illness:   May 31, 2024    HISTORY: Lupe Sifuentes is a 43 year old female is here for follow-up. We have been following her for  urinary urgency/frequency, KIANA. Has failed oxybutynin and vesicare due to dry mouth in the past. Last seen on 7/13/23 and reported her symptoms were well controlled on myrbetriq 25mg daily. Did not go to PFPT. Here today for follow-up.  States the Myrbetriq is no longer working  as well for her for the past several months. Reports strong urgency to void to the point where she almost doesn't make it. Does not report KIANA. Has been under a lot of stress as planning to move to [place] for 1 year with her family. Plan to move at end of June 2024. Patient voices no other concerns at this time.            Past Medical History:     Past Medical History:   Diagnosis Date    Postpartum depression     Formatting of this note might be different from the original.  One to One Replacement            Past Surgical History:   No past surgical history on file.         Social History:     Social History     Tobacco Use    Smoking status: Never    Smokeless tobacco: Never   Substance Use Topics    Alcohol use: Yes     Comment: 1-2 times weekly            Family History:     Family History   Problem Relation Age of Onset    Breast Cancer Mother     Colon Cancer Maternal Aunt               Allergies:     Allergies   Allergen Reactions    Oxycodone Nausea and Vomiting     vomiting   vomiting       Sulfa Antibiotics Hives and Rash     hives, PN: LW Reaction: HIVES      Oxycodone-Acetaminophen GI Disturbance and Nausea and Vomiting     Other reaction(s): Nausea and/or Vomiting              Medications:     Current Outpatient Medications   Medication Sig Dispense Refill    benzoyl peroxide 5 % topical gel Apply topically to face once daily  Indications: Common Acne 60 g 1    desvenlafaxine (PRISTIQ) 50 MG 24 hr tablet Take 1 tablet (50 mg) by mouth daily 90 tablet 3    levonorgestrel (MIRENA) 20 MCG/DAY IUD 1 each by Intrauterine route       No current facility-administered medications for this visit.             Review of Systems:    ROS: 14 point ROS neg other than the symptoms noted above in the HPI.          Physical Exam:   Last menstrual period 04/24/2024.  Data Unavailable, There is no height or weight on file to calculate BMI., 0 lbs 0 oz  Gen appearance: Age-appropriate appearing female in NAD.   HEENT:  EOMI,  conjunctiva clear/white. Normal ROM of neck for age.   Psych:  alert , In no acute distress.  Neuro:  A&Ox3  Resp:  Normal respiratory effort; not in acute respiratory distress.          Data:    Labs:    Creatinine   Date Value Ref Range Status   04/13/2023 0.80 0.51 - 0.95 mg/dL Final   04/06/2009 0.79 0.52 - 1.04 mg/dL Final     Comment:     New IDMS-traceable calibration  beginning 5/1/08

## 2024-05-31 NOTE — NURSING NOTE
Is the patient currently in the state of [place]? YES    Visit mode:VIDEO    If the visit is dropped, the patient can be reconnected by: VIDEO VISIT: Text to cell phone:   Telephone Information:   Mobile [phone]       Will anyone else be joining the visit? NO  (If patient encounters technical issues they should call [phone]    How would you like to obtain your AVS? MyChart    Are changes needed to the allergy or medication list? No, Pt stated no changes to allergies, and Pt stated no med changes    Are refills needed on medications prescribed by this physician? NO    Reason for visit: NUNO BREWSTER

## 2025-01-21 DIAGNOSIS — L70.9 ACNE, UNSPECIFIED ACNE TYPE: ICD-10-CM

## 2025-01-21 RX ORDER — BENZOYL PEROXIDE 5 G/100G
GEL TOPICAL
Qty: 60 G | Refills: 2 | Status: SHIPPED | OUTPATIENT
Start: 2025-01-21

## 2025-01-21 NOTE — TELEPHONE ENCOUNTER
Benzoyl peroxide 5 % gel    Last Office Visit: 5/24/24  Future [facility] Appointments: None  Medication last refilled: 3/28/24 #60 g with 1 refill(s)    Prescription approved per [facility] Refill Protocol.    [employer] message sent to patient to call and schedule annual exam appointment.    EDWARD FangN, RN, CCM

## 2025-03-02 ENCOUNTER — HEALTH MAINTENANCE LETTER (OUTPATIENT)
Age: 45
End: 2025-03-02

## 2025-05-04 DIAGNOSIS — F41.8 DEPRESSION WITH ANXIETY: ICD-10-CM

## 2025-05-06 RX ORDER — DESVENLAFAXINE 50 MG/1
50 TABLET, FILM COATED, EXTENDED RELEASE ORAL DAILY
Qty: 90 TABLET | Refills: 0 | Status: SHIPPED | OUTPATIENT
Start: 2025-05-06

## 2025-05-06 NOTE — TELEPHONE ENCOUNTER
Medication requested: desvenlafaxine (PRISTIQ) 50 MG 24 hr tablet   Last office visit: 5/24/24  [facility] appointments: none  Medication last refilled: 5/24/24; 90 + 3 refills  Last qualifying labs:    5/24/2024  11:23 AM   PHQ-9 / CHANDNI-7 Scores 8/2015 to present    CHANDNI-7 Score DocFlow 5       5/24/2024  11:22 AM   PHQ-9 / CHADNNI-7 Scores 8/2015 to present    PHQ-9 Score DocFlow 10      BP Readings from Last 3 Encounters:   05/24/24 120/67   01/09/24 108/71   12/19/23 105/59     Prescription approved per [facility] Refill Protocol.    Gopal BARNES, RN  05/06/25 10:44 AM

## 2025-05-07 DIAGNOSIS — R39.15 URINARY URGENCY: ICD-10-CM

## 2025-05-07 DIAGNOSIS — R35.0 URINARY FREQUENCY: ICD-10-CM

## 2025-05-07 RX ORDER — MIRABEGRON 50 MG/1
50 TABLET, FILM COATED, EXTENDED RELEASE ORAL DAILY
Qty: 90 TABLET | Refills: 0 | Status: SHIPPED | OUTPATIENT
Start: 2025-05-07

## 2025-07-22 ENCOUNTER — VIRTUAL VISIT (OUTPATIENT)
Dept: UROLOGY | Facility: CLINIC | Age: 45
End: 2025-07-22
Payer: COMMERCIAL

## 2025-07-22 DIAGNOSIS — R35.0 URINARY FREQUENCY: ICD-10-CM

## 2025-07-22 DIAGNOSIS — N39.3 FEMALE STRESS INCONTINENCE: ICD-10-CM

## 2025-07-22 DIAGNOSIS — R39.15 URINARY URGENCY: Primary | ICD-10-CM

## 2025-07-22 PROCEDURE — 98001 SYNCH AUDIO-VIDEO NEW LOW 30: CPT | Performed by: PHYSICIAN ASSISTANT

## 2025-07-22 RX ORDER — MIRABEGRON 50 MG/1
50 TABLET, FILM COATED, EXTENDED RELEASE ORAL DAILY
Qty: 90 TABLET | Refills: 3 | Status: SHIPPED | OUTPATIENT
Start: 2025-07-22

## 2025-07-22 RX ORDER — ESTRADIOL 0.05 MG/D
PATCH, EXTENDED RELEASE TRANSDERMAL
COMMUNITY
Start: 2025-04-02

## 2025-07-22 NOTE — LETTER
7/22/2025       RE: Lupe Sifuentes  [address]  [place] [address]     Dear Colleague,    Thank you for referring your patient, Lupe Sifuentes, to the [facility] at [facility]. Please see a copy of my visit note below.    Video-Visit Details    Type of service:  Video Visit    Video Start Time: 9:21 AM    Video End Time:9:29 AM    Originating Location (pt. Location): Home in [place]     Distant Location (provider location): onsite    Platform used for Video Visit: [facility]    Urology Clinic    Name: Lupe Sifuentes    MRN: [id number]   YOB: 1980  Accompanied at today's visit by:self              Assessment and Plan:   44 year old female with urinary urgency/frequency and rare KIANA     - continue myrbetriq 50g daily; renewed for 1 year.   - patient interested in PFPT; referral placed.  - discussed possibly adding antimuscarinic such as vesicare, however does have dry mouth with antimuscarinics.   - Briefly discussed third line options.  - follow-up in about 6 months for sx check.     Orders Placed This Encounter   Procedures     Physical Therapy  Referral     After discussing the assessment and plan with patient, patient verbalized understanding and agreed to the above plan. All questions answered.     16 minutes were spent today on the date of the encounter in reviewing the EMR, direct patient care, coordination of care and documentation in addition to review of labs, myrbetriq renewed, referral to PFPT.     Monika Rolle PA-C  July 22, 2025    Patient Care Team:  Babita Eli MD as PCP - General (Family Medicine)  Monika Rolle PA-C as Physician Assistant  Monika Rolle PA-C as Assigned Surgical Provider  Babita Eli MD as Assigned PCP  SELF, REFERRED          Chief Complaint:   Urinary urgency/frequency           History of Present Illness:   July 22, 2025    HISTORY: Lupe ALEJANDRO  Bear is a 44 year old female is here for follow-up. We have been following her for urinary urgency/frequency and rare KIANA. Has failed oxybutynin and vesicare secondary to dry mouth. Has not been interested in PFPT in the past. Last seen on 5/31/24. Discussed at that time how stress can affect bladder sx. Increased myrbetriq to 50mg daily. She then moved to [place] for 1 year and is back in [place]. Here today for follow-up. States myrbetriq overall has been helping. Still has times when has strong urgency to void especially when driving back home and getting close to home. Stress/anxiety also contribute to sx. Is interested in PFPT. PSH includes IUD placement in 2021. Has had 2 c-sections. Patient voices no other concerns at this time.            Past Medical History:     Past Medical History:   Diagnosis Date     Postpartum depression 7/1/2013    Formatting of this note might be different from the original.  One to One Replacement            Past Surgical History:   No past surgical history on file.         Social History:     Social History     Tobacco Use     Smoking status: Never     Smokeless tobacco: Never   Substance Use Topics     Alcohol use: Yes     Comment: 1-2 times weekly            Family History:     Family History   Problem Relation Age of Onset     Breast Cancer Mother      Colon Cancer Maternal Aunt               Allergies:     Allergies   Allergen Reactions     Oxycodone Nausea and Vomiting     vomiting   vomiting        Sulfa Antibiotics Hives and Rash     hives, PN: LW Reaction: HIVES       Oxycodone-Acetaminophen GI Disturbance and Nausea and Vomiting     Other reaction(s): Nausea and/or Vomiting              Medications:     Current Outpatient Medications   Medication Sig Dispense Refill     benzoyl peroxide 5 % topical gel Apply topically to face once daily for common acne. 60 g 2     desvenlafaxine (PRISTIQ) 50 MG 24 hr tablet Take 1 tablet by mouth daily. 90 tablet 0     estradiol (VIVELLE-DOT)  0.05 MG/24HR bi-weekly patch        levonorgestrel (MIRENA) 20 MCG/DAY IUD 1 each by Intrauterine route       mirabegron (MYRBETRIQ) 50 MG 24 hr tablet Take 1 tablet (50 mg) by mouth daily. 90 tablet 3     No current facility-administered medications for this visit.             Review of Systems:    ROS: 14 point ROS neg other than the symptoms noted above in the HPI.          Physical Exam:   There were no vitals taken for this visit.  Data Unavailable, There is no height or weight on file to calculate BMI., 0 lbs 0 oz  Gen appearance: Age-appropriate appearing female in NAD.   HEENT:  EOMI, conjunctiva clear/white. Normal ROM of neck for age.   Psych:  alert , In no acute distress.  Neuro:  A&Ox3  Resp:  Normal respiratory effort; not in acute respiratory distress.          Data:    Labs:    Creatinine   Date Value Ref Range Status   04/13/2023 0.80 0.51 - 0.95 mg/dL Final   04/06/2009 0.79 0.52 - 1.04 mg/dL Final     Comment:     New IDMS-traceable calibration  beginning 5/1/08            Again, thank you for allowing me to participate in the care of your patient.      Sincerely,    Monika Rolle PA-C

## 2025-07-22 NOTE — PROGRESS NOTES
"Virtual Visit Details    Type of service:  Video Visit     Originating Location (pt. Location): {video visit patient location:214711::\"Home\"}  {PROVIDER LOCATION On-site should be selected for visits conducted from your clinic location or adjoining [facility] hospital, academic office, or other nearby [facility] building. Off-site should be selected for all other provider locations, including home:[id number]}  Distant Location (provider location):  {virtual location provider:886706}  Platform used for Video Visit: {Virtual Visit Platforms:529793::\"CompareMyFare\"}  "

## 2025-07-22 NOTE — PATIENT INSTRUCTIONS
Referral to pelvic floor physical therapy was placed. They will contact you to schedule the appointment.    Continue myrbetriq 50mg daily.

## 2025-07-22 NOTE — PROGRESS NOTES
Video-Visit Details    Type of service:  Video Visit    Video Start Time: 9:21 AM    Video End Time:9:29 AM    Originating Location (pt. Location): Home in [place]     Distant Location (provider location): onsite    Platform used for Video Visit: [facility]    Urology Clinic    Name: Lupe Sifuentes    MRN: [id number]   YOB: 1980  Accompanied at today's visit by:self              Assessment and Plan:   44 year old female with urinary urgency/frequency and rare KINAA     - continue myrbetriq 50g daily; renewed for 1 year.   - patient interested in PFPT; referral placed.  - discussed possibly adding antimuscarinic such as vesicare, however does have dry mouth with antimuscarinics.   - Briefly discussed third line options.  - follow-up in about 6 months for sx check.     Orders Placed This Encounter   Procedures    Physical Therapy  Referral     After discussing the assessment and plan with patient, patient verbalized understanding and agreed to the above plan. All questions answered.     16 minutes were spent today on the date of the encounter in reviewing the EMR, direct patient care, coordination of care and documentation in addition to review of labs, myrbetriq renewed, referral to PFPT.     Monika Rolle PA-C  July 22, 2025    Patient Care Team:  Babita Eli MD as PCP - General (Family Medicine)  Monika Rolle PA-C as Physician Assistant  Monika Rolle PA-C as Assigned Surgical Provider  Babita Eli MD as Assigned PCP  SELF, REFERRED          Chief Complaint:   Urinary urgency/frequency           History of Present Illness:   July 22, 2025    HISTORY: Lupe Sifuentes is a 44 year old female is here for follow-up. We have been following her for urinary urgency/frequency and rare KIANA. Has failed oxybutynin and vesicare secondary to dry mouth. Has not been interested in PFPT in the past. Last seen on 5/31/24. Discussed at that time how stress can affect bladder sx.  Increased myrbetriq to 50mg daily. She then moved to [place] for 1 year and is back in [place]. Here today for follow-up. States myrbetriq overall has been helping. Still has times when has strong urgency to void especially when driving back home and getting close to home. Stress/anxiety also contribute to sx. Is interested in PFPT. PSH includes IUD placement in 2021. Has had 2 c-sections. Patient voices no other concerns at this time.            Past Medical History:     Past Medical History:   Diagnosis Date    Postpartum depression 7/1/2013    Formatting of this note might be different from the original.  One to One Replacement            Past Surgical History:   No past surgical history on file.         Social History:     Social History     Tobacco Use    Smoking status: Never    Smokeless tobacco: Never   Substance Use Topics    Alcohol use: Yes     Comment: 1-2 times weekly            Family History:     Family History   Problem Relation Age of Onset    Breast Cancer Mother     Colon Cancer Maternal Aunt               Allergies:     Allergies   Allergen Reactions    Oxycodone Nausea and Vomiting     vomiting   vomiting       Sulfa Antibiotics Hives and Rash     hives, PN: LW Reaction: HIVES      Oxycodone-Acetaminophen GI Disturbance and Nausea and Vomiting     Other reaction(s): Nausea and/or Vomiting              Medications:     Current Outpatient Medications   Medication Sig Dispense Refill    benzoyl peroxide 5 % topical gel Apply topically to face once daily for common acne. 60 g 2    desvenlafaxine (PRISTIQ) 50 MG 24 hr tablet Take 1 tablet by mouth daily. 90 tablet 0    estradiol (VIVELLE-DOT) 0.05 MG/24HR bi-weekly patch       levonorgestrel (MIRENA) 20 MCG/DAY IUD 1 each by Intrauterine route      mirabegron (MYRBETRIQ) 50 MG 24 hr tablet Take 1 tablet (50 mg) by mouth daily. 90 tablet 3     No current facility-administered medications for this visit.             Review of Systems:    ROS: 14 point ROS neg  other than the symptoms noted above in the HPI.          Physical Exam:   There were no vitals taken for this visit.  Data Unavailable, There is no height or weight on file to calculate BMI., 0 lbs 0 oz  Gen appearance: Age-appropriate appearing female in NAD.   HEENT:  EOMI, conjunctiva clear/white. Normal ROM of neck for age.   Psych:  alert , In no acute distress.  Neuro:  A&Ox3  Resp:  Normal respiratory effort; not in acute respiratory distress.          Data:    Labs:    Creatinine   Date Value Ref Range Status   04/13/2023 0.80 0.51 - 0.95 mg/dL Final   04/06/2009 0.79 0.52 - 1.04 mg/dL Final     Comment:     New IDMS-traceable calibration  beginning 5/1/08

## 2025-07-22 NOTE — NURSING NOTE
Current patient location: [place]    Is the patient currently in the state of [place]? YES    Visit mode: VIDEO    If the visit is dropped, the patient can be reconnected by:VIDEO VISIT: Text to cell phone:   Telephone Information:   Mobile [phone]       Will anyone else be joining the visit? NO  (If patient encounters technical issues they should call [phone]    Are changes needed to the allergy or medication list? Yes, patient would like to remove oxycodone/acetaminophen from her allergy list per e-check in.    Are refills needed on medications prescribed by this physician? Discuss with provider    Rooming Documentation:  Not applicable    Reason for visit: RECHECK    Blanca BREWSTER

## 2025-07-24 ENCOUNTER — TELEPHONE (OUTPATIENT)
Dept: UROLOGY | Facility: CLINIC | Age: 45
End: 2025-07-24
Payer: COMMERCIAL

## 2025-07-24 NOTE — TELEPHONE ENCOUNTER
----- Message from Cuca ALEJANDRO sent at 2025  8:37 AM CDT -----  Regardin month follow up  Return in about 6 months (around 2026) for Follow up, with me. Piyush VELEZ  25

## 2025-08-27 ENCOUNTER — HOSPITAL ENCOUNTER (OUTPATIENT)
Dept: MAMMOGRAPHY | Facility: CLINIC | Age: 45
Discharge: HOME | End: 2025-08-27
Attending: FAMILY MEDICINE
Payer: COMMERCIAL

## 2025-08-27 DIAGNOSIS — Z12.31 VISIT FOR SCREENING MAMMOGRAM: ICD-10-CM

## 2025-08-27 PROCEDURE — 77063 BREAST TOMOSYNTHESIS BI: CPT

## 2025-08-28 ENCOUNTER — THERAPY VISIT (OUTPATIENT)
Dept: PHYSICAL THERAPY | Facility: CLINIC | Age: 45
End: 2025-08-28
Payer: COMMERCIAL

## 2025-08-28 DIAGNOSIS — R39.15 URINARY URGENCY: ICD-10-CM

## 2025-08-28 DIAGNOSIS — R35.0 URINARY FREQUENCY: ICD-10-CM

## 2025-09-03 ENCOUNTER — THERAPY VISIT (OUTPATIENT)
Dept: PHYSICAL THERAPY | Facility: CLINIC | Age: 45
End: 2025-09-03
Attending: PHYSICIAN ASSISTANT
Payer: COMMERCIAL

## 2025-09-03 DIAGNOSIS — R39.15 URINARY URGENCY: Primary | ICD-10-CM

## 2025-09-03 DIAGNOSIS — R35.0 URINARY FREQUENCY: ICD-10-CM

## 2025-09-03 PROCEDURE — 97112 NEUROMUSCULAR REEDUCATION: CPT | Mod: GP

## 2025-09-03 PROCEDURE — 97110 THERAPEUTIC EXERCISES: CPT | Mod: GP
